# Patient Record
Sex: FEMALE | Race: BLACK OR AFRICAN AMERICAN | NOT HISPANIC OR LATINO | Employment: FULL TIME | URBAN - METROPOLITAN AREA
[De-identification: names, ages, dates, MRNs, and addresses within clinical notes are randomized per-mention and may not be internally consistent; named-entity substitution may affect disease eponyms.]

---

## 2019-09-04 ENCOUNTER — HISTORICAL (OUTPATIENT)
Dept: RADIOLOGY | Facility: HOSPITAL | Age: 34
End: 2019-09-04

## 2019-09-26 ENCOUNTER — HISTORICAL (OUTPATIENT)
Dept: ENDOSCOPY | Facility: HOSPITAL | Age: 34
End: 2019-09-26

## 2019-09-30 ENCOUNTER — HISTORICAL (OUTPATIENT)
Dept: ADMINISTRATIVE | Facility: HOSPITAL | Age: 34
End: 2019-09-30

## 2019-10-01 ENCOUNTER — HOSPITAL ENCOUNTER (OUTPATIENT)
Dept: MEDSURG UNIT | Facility: HOSPITAL | Age: 34
End: 2019-10-02
Attending: OBSTETRICS & GYNECOLOGY | Admitting: OBSTETRICS & GYNECOLOGY

## 2019-10-03 LAB
FINAL CULTURE: NO GROWTH
FINAL CULTURE: NO GROWTH

## 2019-10-09 ENCOUNTER — HOSPITAL ENCOUNTER (OUTPATIENT)
Dept: MEDSURG UNIT | Facility: HOSPITAL | Age: 34
End: 2019-10-11
Attending: OBSTETRICS & GYNECOLOGY | Admitting: OBSTETRICS & GYNECOLOGY

## 2019-10-11 LAB
GRAM STN SPEC: NORMAL
GRAM STN SPEC: NORMAL

## 2019-10-23 ENCOUNTER — HOSPITAL ENCOUNTER (OUTPATIENT)
Dept: MEDSURG UNIT | Facility: HOSPITAL | Age: 34
End: 2019-10-23
Attending: OBSTETRICS & GYNECOLOGY | Admitting: OBSTETRICS & GYNECOLOGY

## 2019-10-23 ENCOUNTER — HOSPITAL ENCOUNTER (OUTPATIENT)
Dept: MEDSURG UNIT | Facility: HOSPITAL | Age: 34
End: 2019-10-24
Attending: OBSTETRICS & GYNECOLOGY | Admitting: OBSTETRICS & GYNECOLOGY

## 2020-05-22 ENCOUNTER — HISTORICAL (OUTPATIENT)
Dept: RADIOLOGY | Facility: HOSPITAL | Age: 35
End: 2020-05-22

## 2020-09-11 ENCOUNTER — HISTORICAL (OUTPATIENT)
Dept: ADMINISTRATIVE | Facility: HOSPITAL | Age: 35
End: 2020-09-11

## 2020-09-15 ENCOUNTER — HISTORICAL (OUTPATIENT)
Dept: SURGERY | Facility: HOSPITAL | Age: 35
End: 2020-09-15

## 2021-05-06 ENCOUNTER — HISTORICAL (OUTPATIENT)
Dept: RADIOLOGY | Facility: HOSPITAL | Age: 36
End: 2021-05-06

## 2022-01-19 DIAGNOSIS — R10.2 PELVIC PAIN: Primary | ICD-10-CM

## 2022-01-19 DIAGNOSIS — N80.9 ENDOMETRIOSIS: ICD-10-CM

## 2022-02-03 ENCOUNTER — OFFICE VISIT (OUTPATIENT)
Dept: OBSTETRICS AND GYNECOLOGY | Facility: CLINIC | Age: 37
End: 2022-02-03
Payer: MEDICAID

## 2022-02-03 VITALS — DIASTOLIC BLOOD PRESSURE: 115 MMHG | SYSTOLIC BLOOD PRESSURE: 162 MMHG | WEIGHT: 190 LBS | HEART RATE: 98 BPM

## 2022-02-03 DIAGNOSIS — R10.2 PELVIC PAIN: Primary | ICD-10-CM

## 2022-02-03 PROCEDURE — 99203 PR OFFICE/OUTPT VISIT, NEW, LEVL III, 30-44 MIN: ICD-10-PCS | Mod: S$GLB,,, | Performed by: OBSTETRICS & GYNECOLOGY

## 2022-02-03 PROCEDURE — 99203 OFFICE O/P NEW LOW 30 MIN: CPT | Mod: S$GLB,,, | Performed by: OBSTETRICS & GYNECOLOGY

## 2022-02-03 PROCEDURE — 4010F ACE/ARB THERAPY RXD/TAKEN: CPT | Mod: CPTII,S$GLB,, | Performed by: OBSTETRICS & GYNECOLOGY

## 2022-02-03 PROCEDURE — 1159F MED LIST DOCD IN RCRD: CPT | Mod: CPTII,S$GLB,, | Performed by: OBSTETRICS & GYNECOLOGY

## 2022-02-03 PROCEDURE — 3077F SYST BP >= 140 MM HG: CPT | Mod: CPTII,S$GLB,, | Performed by: OBSTETRICS & GYNECOLOGY

## 2022-02-03 PROCEDURE — 3077F PR MOST RECENT SYSTOLIC BLOOD PRESSURE >= 140 MM HG: ICD-10-PCS | Mod: CPTII,S$GLB,, | Performed by: OBSTETRICS & GYNECOLOGY

## 2022-02-03 PROCEDURE — 1159F PR MEDICATION LIST DOCUMENTED IN MEDICAL RECORD: ICD-10-PCS | Mod: CPTII,S$GLB,, | Performed by: OBSTETRICS & GYNECOLOGY

## 2022-02-03 PROCEDURE — 3080F DIAST BP >= 90 MM HG: CPT | Mod: CPTII,S$GLB,, | Performed by: OBSTETRICS & GYNECOLOGY

## 2022-02-03 PROCEDURE — 3080F PR MOST RECENT DIASTOLIC BLOOD PRESSURE >= 90 MM HG: ICD-10-PCS | Mod: CPTII,S$GLB,, | Performed by: OBSTETRICS & GYNECOLOGY

## 2022-02-03 PROCEDURE — 4010F PR ACE/ARB THEARPY RXD/TAKEN: ICD-10-PCS | Mod: CPTII,S$GLB,, | Performed by: OBSTETRICS & GYNECOLOGY

## 2022-02-03 RX ORDER — LISINOPRIL 10 MG/1
TABLET ORAL
COMMUNITY
Start: 2022-01-12

## 2022-02-03 NOTE — PROGRESS NOTES
Subjective:       Patient ID: Waldo Yu is a 36 y.o. female.    Chief Complaint:  Consult      History of Present Illness  HPI  Pt here with abd and pelvic pain- has appt with gyn onc for endo.  Has had multiple surgeries.  Pt states she wants to establish care.  Pt has extensive history in  and asked me today for pain meds.      GYN & OB History  No LMP recorded. Patient has had a hysterectomy.   Date of Last Pap: No result found    OB History    Para Term  AB Living   5 3 3   1 3   SAB IAB Ectopic Multiple Live Births   1              # Outcome Date GA Lbr Bari/2nd Weight Sex Delivery Anes PTL Lv   5             4 SAB            3 Term            2 Term            1 Term                Review of Systems  Review of Systems   Constitutional: Negative for chills and fever.   Respiratory: Negative for shortness of breath.    Cardiovascular: Negative for chest pain.   Gastrointestinal: Positive for abdominal pain. Negative for blood in stool, constipation, diarrhea, nausea, vomiting and reflux.   Genitourinary: Negative for dysmenorrhea, dyspareunia, dysuria, hematuria, hot flashes, menorrhagia, menstrual problem, pelvic pain, vaginal bleeding, vaginal discharge, postcoital bleeding and vaginal dryness.   Musculoskeletal: Negative for arthralgias and joint swelling.   Integumentary:  Negative for rash and hair changes.   Psychiatric/Behavioral: Negative for depression. The patient is not nervous/anxious.            Objective:    Physical Exam:   Constitutional: She appears well-developed and well-nourished. No distress.    HENT:   Head: Normocephalic and atraumatic.    Eyes: Conjunctivae and EOM are normal.    Neck: No tracheal deviation present. No thyromegaly present.    Cardiovascular: Exam reveals no clubbing, no cyanosis and no edema.     Pulmonary/Chest: Effort normal. No respiratory distress.        Abdominal: Soft. She exhibits no distension and no mass. There is no abdominal  tenderness. There is no rebound and no guarding. No hernia.     Genitourinary:    Vagina and rectum normal.      Pelvic exam was performed with patient supine.   There is no rash, tenderness, lesion or injury on the right labia. There is no rash, tenderness, lesion or injury on the left labia. Right adnexum displays no mass, no tenderness and no fullness. Left adnexum displays no mass, no tenderness and no fullness. Vaginal cuff normal.  No  no vaginal discharge in the vagina. Cervix is absent.Uterus is absent.                Skin: Skin is warm and dry. She is not diaphoretic. No cyanosis. Nails show no clubbing.    Psychiatric: She has a normal mood and affect. Her behavior is normal. Judgment and thought content normal.          Assessment:        1. Pelvic pain                Plan:      Keep appt with gyn onc

## 2022-04-07 ENCOUNTER — HISTORICAL (OUTPATIENT)
Dept: ADMINISTRATIVE | Facility: HOSPITAL | Age: 37
End: 2022-04-07
Payer: MEDICAID

## 2022-04-24 VITALS
SYSTOLIC BLOOD PRESSURE: 139 MMHG | WEIGHT: 203.06 LBS | HEIGHT: 63 IN | BODY MASS INDEX: 35.98 KG/M2 | OXYGEN SATURATION: 100 % | DIASTOLIC BLOOD PRESSURE: 97 MMHG

## 2022-04-30 NOTE — PROGRESS NOTES
Patient:   Waldo Yu            MRN: [id number]            FIN: [id number]               Age:   33 years     Sex:  Female     :  1985   Associated Diagnoses:   None   Author:   Marlin Ross MD      Pre-Op Dx:  cuff dehiscience s/p cuff endometrioma resection        Plan: vaginal cuff revision    Reviewed resident's H&P.  Agree with plan.  Proceed with case

## 2022-04-30 NOTE — ED PROVIDER NOTES
Patient:   Waldo Yu            MRN: [id number]            FIN: [id number]               Age:   33 years     Sex:  Female     :  1985   Associated Diagnoses:   Vaginal bleeding; History of hysterectomy   Author:   Juan M Thomas MD      Basic Information   Time seen: Immediately upon arrival.   History source: Patient.   Arrival mode: Private vehicle.   History limitation: None.   Additional information: Chief Complaint from Nursing Triage Note : Chief Complaint   10/23/2019 16:31 CDT     Chief Complaint           recent vaginal cuff sx this month with packing removal on 10/20 - patient of Dr. Aleman - patient c/o increased pain post packing removal  .   Provider/Visit info:   Time Seen:  Juan M Thomas MD / 10/23/2019 17:29  .   History of Present Illness   The patient presents for recent vaginal cuff surgery, subsequent hemorrhage, seen at hospital in [facility].  2 days in hospital patient contacted her GYN Dr. Aleman who instructed her to report to the ER.  .        Review of Systems   Constitutional symptoms:  No fever, no chills, no sweats.    Skin symptoms:  No rash,    Eye symptoms:  No recent vision problems,    ENMT symptoms:  No sore throat,    Respiratory symptoms:  No shortness of breath, no cough.    Cardiovascular symptoms:  No chest pain, no tachycardia.    Gastrointestinal symptoms:  No abdominal pain, no nausea, no vomiting.    Genitourinary symptoms:  Negative except as documented in HPI.   Musculoskeletal symptoms:  No back pain, no Muscle pain.    Neurologic symptoms:  No headache, no dizziness.              Additional review of systems information: All other systems reviewed and otherwise negative.      Health Status   Allergies:    Allergic Reactions (Selected)  Severity Not Documented  NKDA - No known drug allergies- No reactions were documented.,    Allergies (1) Active Reaction  NKDA - No known drug allergies None Documented  .   Medications:  (Selected)    Inpatient Medications  Ordered  Ibuprofen 600 mg tablet: 600 mg, form: Tab, Oral, q6hr, first dose 10/23/19 17:21:00 CDT, STAT, Give for pain level 1-6, wait 30 min and give opioid if unresolved/no improvement.  MiraLax (polyethylene glycol 3350): 17 gm, form: Powder-Recon, Oral, Daily, first dose 10/23/19 17:24:00 CDT, STAT  Ondansetron INJ.  (IV Push/IM)  2 mg/mL: 4 mg, form: Injection, IV Push, q6hr PRN for nausea/vomiting, first dose 10/23/19 17:21:00 CDT, STAT, Choose first if ordered with other anitemetics or if jonathan PO intake.  Tylenol: 500 mg, form: Tab, Oral, BID PRN for fever, first dose 10/23/19 17:22:00 CDT, STAT  acetaminophen-oxycodone 325 mg-5 mg oral tablet: 1 tab(s), form: Tab, Oral, q4hr PRN for pain, first dose 10/23/19 17:21:00 CDT, Pain scale 1-5; DO NOT GIVE WITHIN 6 hrs of Ofirmev or Oxycodone  acetaminophen-oxycodone 325 mg-5 mg oral tablet: 2 tab(s), form: Tab, Oral, q4hr PRN for pain, first dose 10/23/19 17:21:00 CDT, Pain scale 6-10; DO NOT GIVE WITHIN 6 hrs of Ofirmev or Oxycodone  belladonna-opium 16.2 mg-60 mg rectal suppository: 1 supp, form: Supp, [place] (rectal), At Bedtime PRN for pain, mild, first dose 10/23/19 17:26:00 CDT  docusate sodium 100 mg oral capsule: 100 mg, form: Cap, Oral, BID, first dose 10/23/19 17:21:00 CDT, STAT  ferrous sulfate 325 mg oral tablet: 325 mg, form: Tab, Oral, Once, first dose 10/23/19 17:24:00 CDT, stop date 10/23/19 17:24:00 CDT, STAT  gabapentin 300 mg oral capsule: 300 mg, form: Cap, Oral, At Bedtime, first dose 10/23/19 17:30:00 CDT, STAT  promethazine 25 mg/mL injectable solution: 12.5 mg, form: Injection, IV Push, q6hr PRN for nausea/vomiting, first dose 10/23/19 17:21:00 CDT, STAT, may also give IM route if needed.  simethicone 80 mg Chew Tab ( Mylanta Gas ): 80 mg, form: Tab-Chew, Oral, q4hr PRN for gas, first dose 10/23/19 17:21:00 CDT, STAT  Prescriptions  Prescribed  Percocet 10/325 oral tablet: 1 tab(s), Oral, q6hr, PRN PRN for pain, # 7  tab(s), 0 Refill(s), Pharmacy: [id number]  Premarin 0.625 mg/g vaginal cream with applicator: 1 gm =, VAG, qPM, # 42.5 gm, 0 Refill(s), Pharmacy: [facility]  gabapentin 300 mg oral capsule: 300 mg = 1 cap(s), Oral, QHS Resp, # 30 cap(s), 0 Refill(s), Pharmacy: [facility]  lisinopril 10 mg oral tablet: 10 mg = 1 tab(s), Oral, Daily, # 90 tab(s), 2 Refill(s), Pharmacy: [facility] #[id number].      Past Medical/ Family/ Social History   Medical history:    Resolved  Pregnant ([id number]): Onset on 3/17/2004 at 18 years.  Resolved on 12/15/2004 at 19 years.  Pregnant ([id number]): Onset on 10/19/2000 at 14 years.  Resolved on 7/26/2001 at 15 years.  Pregnant ([id number]):  Resolved on 4/26/1996 at 10 years..   Surgical history:    Revision of Vaginal cuff (None) on 10/10/2019 at 33 Years.  Comments:  10/10/2019 8:59 Gina Parker RN  auto-populated from documented surgical case  Proctoscopy (None) on 10/1/2019 at 33 Years.  Comments:  10/1/2019 11:05 Cris Perez RN  auto-populated from documented surgical case  Cystoscopy (None) on 10/1/2019 at 33 Years.  Comments:  10/1/2019 11:05 Cris Perez RN  auto-populated from documented surgical case  Fulgaration of Endometriosis (None) on 10/1/2019 at 33 Years.  Comments:  10/1/2019 11:05 Cris Perez RN  auto-populated from documented surgical case  Revision of Vaginal cuff (None) on 10/1/2019 at 33 Years.  Comments:  10/1/2019 11:05 Cris Perez RN  auto-populated from documented surgical case  Stent Insertion/Removal (None) on 10/1/2019 at 33 Years.  Comments:  10/1/2019 11:05 Cris Perez RN  auto-populated from documented surgical case  Lysis Of Adhesions (None) on 10/1/2019 at 33 Years.  Comments:  10/1/2019 11:05 CDT - Rustam MACE, Cris WILD  auto-populated from documented surgical case  Polypectomy (None) on 9/26/2019 at 33 Years.  Comments:  9/26/2019 11:04 CDJANETH Campos  Perlita MACE  auto-populated from documented surgical case  Colonoscopy (None) on 2019 at 33 Years.  Comments:  2019 11:04 Perlita Joaquin RN  auto-populated from documented surgical case  Hysterectomy Laparoscopic Robotic Assist Si (.) on 2016 at 30 Years.  Comments:  2016 13:02 Herlinda Mock RN  auto-populated from documented surgical case  Cystectomy Laparoscopic Robotic Assist (.) on 2016 at 30 Years.  Comments:  2016 13:02 Herlinda Mock RN  auto-populated from documented surgical case   section ([id number]) on 1996 at 10 Years.  biopsy - bladder.  cholecystectomy.  hernia repair..   Family history:    Acute myocardial infarction.  Mother  Diabetes mellitus type 1.  Sister  .   Social history:    Social & Psychosocial Habits    Alcohol  2016  Use: Never    Employment/School  2018  Status: Employed    Activity level: Moderate physical work    2019  Status: Employed    Home/Environment  2019  Lives with: Children    Living situation: Home/Independent    Nutrition/Health  2019  Home Diet Regular    Appetite Good    Other    Comment: Single, Hinduism - 2018 10:17 - Nora Armendariz    Sexual  2018  Sexually active: Yes    First active at age: 14 Years    Current partners: 1    Uses condoms: No    History of sexual abuse: No    2019  Sexually active: Yes    History of STIs No    Substance Use  2016  Use: Never    Tobacco  10/23/2019  Use: Never (less than 100 in l    Patient Wants Consult For Cessation Counseling N/A    Abuse/Neglect  10/23/2019  SHX Any signs of abuse or neglect No  .   Problem list:    Active Problems (18)  Abnormal vaginal bleeding   Bladder endometriosis   Breast pain   Cigarette smoker   Deep infiltrative endometriosis   Endometriosis, pelvic peritoneum   Female pelvic pain   Gastric reflux   HTN (hypertension)   Hypertension   Myalgia   Obesity   Pelvic pain   Post-op  pain   Preop examination   S/P laparoscopic hysterectomy   Uterine endometriosis   Vaginal endometriosis   .      Physical Examination               Vital Signs      Vital Signs (last 24 hrs)_____  Last Charted___________  Temp Oral     36.8 DegC  (OCT 23 16:31)  Heart Rate Peripheral   H 104bpm  (OCT 23 16:31)  Resp Rate         18 br/min  (OCT 23 16:31)  SBP      H 159mmHg  (OCT 23 16:31)  DBP      H 114mmHg  (OCT 23 16:31)  SpO2      98 %  (OCT 23 16:31)  Weight      86 kg  (OCT 23 16:31)  Height      160 cm  (OCT 23 16:31)  BMI      33.59  (OCT 23 16:31)  .   General:  Alert, no acute distress.    Skin:  Warm, dry.    Head:  Normocephalic.   Neck:  Supple, trachea midline.    Eye:  Pupils are equal, round and reactive to light, extraocular movements are intact.    Ears, nose, mouth and throat:  Oral mucosa moist.   Cardiovascular:  Regular rate and rhythm, No murmur, Normal peripheral perfusion, No edema.    Respiratory:  Lungs are clear to auscultation, respirations are non-labored, breath sounds are equal, Symmetrical chest wall expansion.    Gastrointestinal:  Soft, Nontender, Non distended, Normal bowel sounds, No organomegaly.    Genitourinary:  deferred to GYN.   Back:  Normal range of motion.   Musculoskeletal:  No swelling, no deformity.    Neurological:  Alert and oriented to person, place, time, and situation, normal motor observed, normal speech observed.    Psychiatric:  Cooperative.      Medical Decision Making   Documents reviewed:  Emergency department nurses' notes.   Results review:  Lab results : Lab View   10/23/2019 16:49 CDT     Sodium Lvl                140 mmol/L                             Potassium Lvl             3.2 mmol/L  LOW                             Chloride                  106 mmol/L                             CO2                       30 mmol/L                             Calcium Lvl               9.6 mg/dL                             Glucose Lvl               87 mg/dL                              BUN                       6 mg/dL  LOW                             Creatinine                0.70 mg/dL                             eGFR-AA                   >105 mL/min                             eGFR-ZAK                  101 mL/min                             Bili Total                0.3 mg/dL                             Bili Direct               0.1 mg/dL                             Bili Indirect             0.2 mg/dL                             AST                       11 unit/L  LOW                             ALT                       18 unit/L                             Alk Phos                  76 unit/L                             Total Protein             7.9 gm/dL                             Albumin Lvl               3.4 gm/dL                             Globulin                  4.50 gm/mL  HI                             A/G Ratio                 0.8 ratio  LOW                             WBC                       10.0 x10(3)/mcL                             RBC                       3.71 x10(6)/mcL  LOW                             Hgb                       10.5 gm/dL  LOW                             Hct                       33.0 %  LOW                             Platelet                  272 x10(3)/mcL                             MCV                       88.9 fL                             MCH                       28.3 pg                             MCHC                      31.8 gm/dL                             RDW                       13.3 %                             MPV                       9.7 fL                             Abs Neut                  6.02 x10(3)/mcL                             Neutro Auto               60 %  NA                             Lymph Auto                24 %  NA                             Mono Auto                 7 %  NA                             Eos Auto                  8 %  NA                             Abs Eos                   0.8  x10(3)/mcL                             Basophil Auto             1 %  NA                             Abs Neutro                6.02 x10(3)/mcL                             Abs Lymph                 2.4 x10(3)/mcL                             Abs Mono                  0.7 x10(3)/mcL                             Abs Baso                  0.1 x10(3)/mcL                             IG%                       0 %  NA                             IG#                       0.0300  NA  .      Reexamination/ Reevaluation   Vital signs   results included from flowsheet : Vital Signs   10/23/2019 16:31 CDT     Temperature Oral          36.8 DegC                             Temperature Oral (calculated)             98.24 DegF                             Peripheral Pulse Rate     104 bpm  HI                             Respiratory Rate          18 br/min                             SpO2                      98 %                             Oxygen Therapy            Room air                             Systolic Blood Pressure   159 mmHg  HI                             Diastolic Blood Pressure  114 mmHg  HI        Impression and Plan   Diagnosis   Vaginal bleeding (JNL75-NH N93.9)   History of hysterectomy (HKB89-EQ Z90.710)      Calls-Consults   -  10/23/2019 17:49:00 , GYN, consult.    Plan   Condition: Guarded.    Disposition: Admit time  10/23/2019 17:49:00, Place in Observation Unit.
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
## 2022-05-02 NOTE — HISTORICAL [facility] CERNER
This is a historical note converted from [employer]. Formatting and pictures may have been removed.  Please reference [employer] for original formatting and attached multimedia. Indication for Surgery  34 yo F s/p TLH, partial bladder cystectomy for endometriosis in 2016 with recurrent symptoms of pelvic pain, MRI findings of suspected endometrioma at vaginal cuff, as well as recurrent vaginal bleeding- not controlled with Depot Lupron or Orilissa at this point.? Patient had been followed for 1 year and conservative measures done such as pelvic pT as well as medications without much relief.? She desired surgical treatment.? Risks of : bladder injury, incomplete resection of endometriosis, persistence of problem, need for colon surgery were discussed.? She had seen the general surgery team here pre-operatively in planning for this procedure and underwent colonoscopy recently that did not reveal any mucosal endometriosis.  Of note, the patient had previously been counseled on oophorectomy versus leaving ovaries and she strongly desired to leave ovaries in place.? She understood the risks of needing further surgery or persistence of endometriosis with these in place.? The plan will be to suppress medically.? Of note, her risks of earlier death from all cause mortality were reviewed with her if oophorectomy performed at this age without HRT given.  Preoperative Diagnosis  vaginal cuff endometriosis  pelvic pain  post hysterectomy vaginal bleeding  Postoperative Diagnosis  same  rectovaginal endometriosis/nodularity  Operation  Laparoscopic lysis of adhesions, excision of vaginal cuff endometrioma, vaginal cuff revision and laparoscopic closure? (see separate note by Dr. Mario/Dr. Clifford with general surgery for intraoperative consultation)  Cystoscopy with ureteral stent placement  ?   *Modifier requested as I asked Dr. Ross to assist me (Co-surgeon)?as a qualified assistant given expert level of?assistance needed?during  this difficult case.  Surgeon(s)  Dr. TRAVIS Arce  Assistant  Dr. FREDERIC Ross**  Dr. TR Murphy  Anesthesia  GeTA, 1% lidocaine plain  Estimated Blood Loss  200cc  ?  IVF:  Urine Output  150cc  Findings  vaginal:? tethering and palpable 3cm area of mass like at posterior uterine wall/upper left cuff, several punctate red areas of endometriotic vesicles.? (Vaginal bleeding?noted on exam c/w patients history-?has?been experiencing bleeding over past week).?  Cystoscopy (prior to procedure): no bladder erythema or evidence of endometriosis, areas of puckering c/w prior resection- well healed.? Bilateral ureteral orifices in normal location.? Post Op: No bladder lesions, trauma or sutures.? Jean Pierre brisk efflux of urine from each ureteral orifices.? (Stents removed at end of case- blood tinged urine noted).  Laparoscopy: Liver, upper abdomen normal in appearance, no endometriosis.? Appendix normal, without endometriosis or adhesions.  Pelvis: small filmy omental adhesions to anterior abdominal wall, sigmoid mesentery adherent to left ovary and left pelvic sidewall, right epiploicae adhesions to right ovarian fossa. Rectosigmoid adherent to vaginal cuff- released.? Areas of bladder peritoneal scar noted, well healed.  Following colpotomy for mass, rectosigmoid area was noted to be densely adherent even further down from original cuff apex.? This was sharply taken down to allow for cuff closure (and ~3cm of cuff removed to proximal vagina).? Mass mostly located in left cuff apex/retroperitoneal area.? No visible borders seen, no endometriosis was visible in vaginal epithelium at end of case.?  ?  Following this, Dr. Mario performed anoscopy (see note for details).  Specimen(s)  vaginal cuff mass (suspected endometrioma, at least 3cm of tissue sent)  Complications  none  Technique  ??The patient was taken to the operating suite with IV fluids running and placed in supine position. ?SCDs were placed and  turned on for DVT prophylaxis.? Antibiotics were given.?The patients arms were tucked at her side with padding. ?General anesthesia was then administered. ?The patient was then placed in lithotomy position with care taken to avoid pressure on vulnerable pressure points. A timeout procedure was performed per protocol. ?Examination was performed and vaginal findings noted.  ??She was then prepped and draped in the usual sterile fashion.??Cystoscopy was performed, findings noted above.? Bilateral open ended ureteral stents were then placed without difficulty (Dr. Finley was present with me during this portion).? A lizama catheter was placed and all other instruments removed. A vaginal spongestick was placed.  ??  ??Attention was then turned to the laparoscopic portion. ?1% lidocaine was injected into?Palmers point in the left upper abdomen.??The 5mm visiport trocar was placed under direct visualization while the abdominal wall was tented upward. ?Once intraperitoneal placement was confirmed, the abdomen was insufflated with CO2 gas to create a pneumoperitoneum. ?A complete survey was taken of the upper abdomen and findings above.??  ?She was then placed in Trendelenburg position and two additional 5mm trocars were placed in the left and right lower quadrants under direct visualization in the same fashion.? A midline 12mm trocar placed? above the umbilicus and one in the?right mid abdomen and a 30 degree 10mm?laparoscope used for the majority of the case from here.??The midline omental adhesions were lysed using the Harmonic device.??Once the?pelvis was exposed filmy and dense adhesions of bilateral pelvic sidewalls (sigmoid and epiploica on both sides) were lysed sharply with scissors.? Additional blunt and sharp dissection using laparoscopic scissors was performed along the avascular planes between the vaginal cuff and the remaining bowel adhesions to this.??This required approximately 30 minutes of?dissection to see  the entire vaginal cuff.?At this point, vaginal mass was palpated on the left vaginal cuff area.? A horizontal incision was made?in the area of vesicouterine reflection using the scissors and care taken to remain posterior and inferior to any vesicouterine adipose tissue. This was carried sharply using the Harmonic device across and to the left toward the cardinal ligament remnant.? The left ureter was palpated with the stent in place and lateral to this dissection.? The dissection was carried?through the vaginal wall for a colpotomy and the intravaginal vesicles seen.? This was grasped with a tenaculum laparoscopically and completely resected based on visualization and palpation.? The dissection was carried laterally and?approximately 3cm of the vaginal epithelium was removed in this area.? The retroperitoneum on this side was also?opened and the hardened area removed retroperitoneally.??During this time, and EEA sizer was placed rectally for mobilization and?delineation.? A vaginal?Allis clamp was placed on the mass and vaginal manipulation employed for delineation as well.  ?   ? Attention was then turned vaginally for complete excision sharply using long Hernandez scissors.?? This was then sent for permanent pathology.  ??Bleeding was?encountered during this time and a figure of eight suture was placed left laterally in the vaginal apex retroperitoneum (posterior to the cardinal ligaments/below the ureter).? Once the specimen was removed,?gloves were exchanged and attention?turned?back to laparoscopy.? The area was reinspected and the rectovaginal area still noted to be tethered to the rectum?approximately 1cm?distal to the existing colpotomy.? Sharp and blunt dissection mobilized this away on bilateral sides of the rectum and midline, just enough?for sufficient cuff closure.? There was no palpable vaginal mass or hardness at this point.?  ?Dr. Clifford was called in to evaluate and it was determined that no further  resection was needed (all vaginal areas removed and colonoscopy did not show mucosal endometriosis).? The left space was coagulated with Walker for hemostasis with care taken to remain well away from ureter.  ? The vaginal cuff was then closed with a running 0-Stratifix and closed in two layers.? Excellent?approximation noted, and no further bleeding noted.?The cuff was copiously irrigated and the irrigant removed.  Floseal was then placed in the left vaginal cuff angle/retroperitoneal space, as well as across the entire cuff.  ?   Under direct laparoscopic visualization, using the Berci needle, the supraumbilical trocar fascia and peritoneum were closed using a single 0-vicryl suture and the trocar was removed. ?Good closure was noted.??  ?  ??The instruments and trocars were removed. ?Five manual breaths were given for evacuation of pneumoperitoneum through the final trocar before it was removed. ?The incisions were closed with a 4-0 Vicryl and skin closed with dermabond skin adhesive. ?Repeat injection of lidocaine performed.? repeat cystoscopy performed, no trauma noted- stents and lizama were removed.? I then performed vaginal inspection and there were no further endometriotic lesions, good mobility of vagina, and?good cuff closure.? I injected?1% lidocaine plain into the vaginal cuff?for periop pain control.? The vagina was then irrigated and EEA sizer had been?removed.??  ?Anoscopy was then performed by Dr. Mairo.  ?  ?? The patient was awakened and taken to recovery in stable condition. ?

## 2022-05-02 NOTE — HISTORICAL [facility] CERNER
This is a historical note converted from [employer]. Formatting and pictures may have been removed.  Please reference [employer] for original formatting and attached multimedia. Chief Complaint  S/P RAMESH, Excision of endometrioma 10/1/19-complants of bleeding postoperative since 10/5/19.  History of Present Illness  34 yo s/p laparoscopic lysis of adhesions, excision of?vaginal cuff endometrioma,?vaginal cuff revision, laparoscopic closure, cystoscopy with?ureteral stent placement and removal, anoscopy at completion of case by General Surgery for deep, infiltrating endometriosis on 10/1/2019.  ?   Patient states she was doing well until she began to experience vaginal bleeding?on October 5th, saturating 12 pads a day. Patient states quality of bleeding changes, sometimes appears watery, at other times appears bright red blood. She also reports concurrent development of intense suprapubic pain stabbing in natures that occasionally radiates to the vagina. Reports occasional lightheadedness and dizziness while ambulating.  Tolerating a regular diet. Reports BM every other day, soft, no straining, no associated pain.  For pain, has been taking ibuprofen TID, gabapentin BID, occasionally lidocaine patches which do help. Ran out of percocet which was helping her while she was taking it. B&O suppositories were not covered by her insurance.  ?  Review of Systems  *Positive ROS are in bold, otherwise negative  ?   General: fevers/chills, fatigue  GI: abdominal pain, diarrhea, constipation  CV: chest pain, palpitations  Resp: SOB, cough  Gyn: bleeding,?abnormal discharge  : hematuria, dysuria, pain with bladder fullness  Neuro: headaches, weakness  MSK: joint?pain, back pain  Physical Exam  ???Vitals & Measurements  ??T:?36.8? ?C (Oral)? HR:?86(Peripheral)? RR:?20? BP:?148/95?  ??HT:?160?cm? WT:?87.6?kg? BMI:?34.22?  General Appearance: Alert, cooperative, no distress  Lungs: Respirations unlabored  Heart: Regular rate  Abdomen:  Soft, obese, suprapubic tenderness  Incisions:  Extremities: Extremities normal, atraumatic, no cyanosis or edema  SSE: normal external female genitalia, vaginal mucosa pink, moist without lesion, cuff examined, watery, serosanguineous discharge noted, cuff probed with q-tip and defect noted at left side of cuff  ?  Assessment/Plan  ?   34 yo s/p laparoscopic lysis of adhesions, excision of?vaginal cuff endometrioma,?vaginal cuff revision, laparoscopic closure, cystoscopy with?ureteral stent placement and removal, anoscopy at completion of case by General Surgery for deep, infiltrating endometriosis on 10/1/2019. Now with vaginal bleeding, cuff defect noted on exam.  ?   Admit for vaginal cuff repair with general anesthesia in OR.  CBC to be collected now.  Patient last ate at 8:30 AM, enchiladas with beans and rice.  Plan to keep patient overnight for observation.  ?  Patient seen and examined with Dr. Aleman. OB History  Pregnancy History???(3,0,0,3)?? ??  Pregnancy # 1?? ? ?**Marked as Sensitive**  Baby 1?????????????Outcome Date:?1996????? Outcome:?Live Birth  ???Outcome or Result:?  ???Gender:?Male????????Gest Age:?Fullterm ??????Wt:??3260 g  ???Hospital:?--????????[facility] Labor:?--  ???Renny Name:?--?????Babys Father:?--  ?  Pregnancy # 2  Baby 1?????????????Outcome Date:?2001????? Outcome:?Live Birth  ???Outcome or Result:?Vaginal  ???Gender:?Female????????Gest Age:?40 weeks ??????Wt:??3856 g  ???Hospital:?--????????[facility] Labor:?--  ???Renny Name:?--?????Babys Father:?--  ?  Pregnancy # 3?? ? ?**Marked as Sensitive**  Baby 1?????????????Outcome Date:?12/15/2004????? Outcome:?Live Birth  ???Outcome or Result:?Vaginal  ???Gender:?Female????????Gest Age:?39 weeks ??????Wt:??4337 g  ???Hospital:?--????????[facility] Labor:?--  ???Renny Name:?--?????Babys Father:?--  Problem List/Past Medical History  Ongoing  ??Abnormal vaginal bleeding  ?Bladder endometriosis  ?Breast  pain  ?Cigarette smoker  ?Deep infiltrative endometriosis  ?Endometriosis, pelvic peritoneum  ?Female pelvic pain  ?Gastric reflux  ?HTN (hypertension)  ?Hypertension  ?Myalgia  ?Obesity  ?Pelvic pain  ?Post-op pain  ?Preop examination  ?S/P laparoscopic hysterectomy  ?Uterine endometriosis  ?Vaginal endometriosis  Historical  ??Pregnant  ??Pregnant  ??Pregnant  Procedure/Surgical History  ?Anoscopy; diagnostic, including collection of specimen(s) by brushing or washing, when performed (separate procedure) (10/01/2019)  Cystoscopy (None) (10/01/2019)  Excision of Vagina, Via Natural or Artificial Opening (10/01/2019)  Excision of vaginal cyst or tumor (10/01/2019)  Fulgaration of Endometriosis (None) (10/01/2019)  Inspection of Lower Intestinal Tract, Via Natural or Artificial Opening Endoscopic (10/01/2019)  Lysis Of Adhesions (None) (10/01/2019)  Proctoscopy (None) (10/01/2019)  Release Sigmoid Colon, Percutaneous Endoscopic Approach (10/01/2019)  Revision of Vaginal cuff (None) (10/01/2019)  Stent Insertion/Removal (None) (10/01/2019)  Colonoscopy (None) (09/26/2019)  Colonoscopy, flexible; with biopsy, single or multiple (09/26/2019)  Colonoscopy, flexible; with removal of tumor(s), polyp(s), or other lesion(s) by snare technique (09/26/2019)  Excision of Sigmoid Colon, Via Natural or Artificial Opening Endoscopic, Diagnostic (09/26/2019)  Excision of Sigmoid Colon, Via Natural or Artificial Opening Endoscopic, Diagnostic (09/26/2019)  Polypectomy (None) (09/26/2019)  Cystectomy Laparoscopic Robotic Assist (.) (04/27/2016)  Dilation of Bilateral Ureters with Intraluminal Device, Via Natural or Artificial Opening Endoscopic (04/27/2016)  Excision of Bladder, Percutaneous Endoscopic Approach (04/27/2016)  Hysterectomy Laparoscopic Robotic Assist Si (.) (04/27/2016)  Release Uterus, Percutaneous Endoscopic Approach (04/27/2016)  Resection of Bilateral Fallopian Tubes, Via Natural or Artificial Opening With  Percutaneous Endoscopic Assistance (2016)  Resection of Uterus, Via Natural or Artificial Opening With Percutaneous Endoscopic Assistance (2016)  Robotic Assisted Procedure of Trunk Region, Percutaneous Endoscopic Approach (2016)   section (1996)  biopsy - bladder  cholecystectomy  hernia repair   ?  Medications  ?gabapentin 300 mg oral capsule, 300 mg= 1 cap(s), Oral, QHS Resp  ?lidocaine 5% topical film, 1 patch(es), TOP, Daily  ?lisinopril 10 mg oral tablet, 10 mg= 1 tab(s), Oral, Daily, 2 refills  ?Orilissa 150 mg oral tablet, 150 mg= 1 tab(s), Oral, Daily, 11 refills  Allergies  NKDA - No known drug allergies   [1]  [1]?[facility] Note; Elly Murphy 10/09/2019 11:49 CDT   I have personally seen and examined this patient in office.? She was healing well, then changed acutely over the weekend when she had pain and felt like?a stitch popped.? She has experienced light bleeding since, and pain that is suprapubic.  Exam: abd soft, NT/ND;? vagina: moist pink vaginal epithelium, cuff appears to have epithelial separation of 1.5cm at the left cuff edge.? Qtip can pass through and gush of serous fluid noted with sanguinous tinge.? No active bleeding, no other contents noted.? Right edge to midline intact.  Appr ttp.  ?  A: Vaginal cuff dehiscence post L/S partial vaginectomy and endometrioma excision laparoscopically; remains HDS.? This is the area where endometrioma was and I suspect the tension of the area versus healing/blood at area has caused this to breakdown.  P: Admit for vaginal cuff closure/placement of suture in left cuff angle.? Plan vaginally.  of note, this is not emergent given stability, CBC is normal, and patient remains HDS.? Since she has eaten a full meal recently 8:30 and again at 11:15am we will admit her for observation and plan this in the AM.? All teams aware.  Pain meds, regular diet given (pt hungry at time of visit, no secondary s/sx of bowel  issues).  ?

## 2022-05-02 NOTE — HISTORICAL [facility] CERNER
This is a historical note converted from [employer]. Formatting and pictures may have been removed.  Please reference [employer] for original formatting and attached multimedia. Indication for Surgery  31-year-old female with endometriosis, rectal bleeding  ?   Preoperative Diagnosis  Endometriosis  Rectal bleeding  ?   Postoperative Diagnosis  Sigmoid?polyp  ?   Operation  Colonoscopy  ?  Surgeon(s)  Miki Kiran MD, PGY 2  ?  Anesthesia  MAC  ?  Estimated Blood Loss  2 cc  ?  Findings  Normal-appearing colonic mucosa  3 mm flat?polyp?in the sigmoid colon at 40 cm  7 mm flat?polyp at 25 cm?sigmoid colon  ?  Specimen(s)  Sigmoid polyp x1  ?  Technique  The risks and benefits of the?procedure were discussed and proper consents obtained. ?Patient was brought to the GI suite?placed in left lateral decubitus position and anesthesia was induced. ?Digital rectal exam was performed that showed good sphincter tone, no palpable masses,?no gross blood. ?The endoscope was then inserted and advanced to the level of the cecum. ?The appendiceal orifice and ileocecal valve were identified. ?The scope was then withdrawn and the walls of the ascending,?transverse, descending, sigmoid colon and rectum were visualized. ?2 flat sessile?polyps were identified?in the sigmoid colon at 40 and?25 cm. ?These were removed with?biopsy forceps and cold snare respectively.? The?mucosa of the colon appeared normal. ?There was some pelvic scarring and?tortuosity to the sigmoid colon colon which failed to distend.? Rectal flexion was performed showed?mild hemorrhoidal congestion. ?The endoscope was then withdrawn and patient awakened from anesthesia.  ?  Recommendations:  Follow-up pathology  Repeat colonoscopy in 5 years

## 2022-05-02 NOTE — HISTORICAL [facility] CERNER
HCG Quant ordered STAT for patient to repeat Sunday    RN phoned patient with recommendations  Per patient, she is aware to get HCG done on Sunday and call the nurse triage line for results  Is aware that Dr. Rueda is on call  Will also get UA done at that time.    RN went in depth on what signs or symptoms to watch out for and when to head to ED  Patient will go to the ED with any severe abdominal pain, heavy bleeding, vomiting, nausea, fever or chills.    Patient is scheduled with Dr. Behrens on Monday at 3 pm  Patient works from 9-1    No further questions or concerns    This TE routed to Dr. Rueda as an FYI per Dr. Behrens request    No further questions or concerns    Encounter open for follow up     This is a historical note converted from [employer]. Formatting and pictures may have been removed.  Please reference [employer] for original formatting and attached multimedia. Admit and Discharge Dates  Admit Date: 10/23/2019  Discharge Date: 10/24/2019  Physicians  Attending Physician - Love BREEN, May S  Admitting Physician - Love BREEN, May S  Primary Care Physician - Physician MD, Non Staff  Discharge Diagnosis  Constipation?K59.00  History of hysterectomy?Z90.710  Lower extremity pain?M79.606  Pain?R52  Post surgical problem?[id number]  S/P laparoscopy?Z98.890  Vaginal bleeding?N93.9  Surgical Procedures  None  Admission Information  Patient admitted with?abdominal pain, LE pain,?vaginal spotting for observation  Hospital Course  32yo??s/p Laparoscopic lysis of adhesions, excision of vaginal cuff endometrioma, vaginal cuff revision and laparoscopic closure with Cystoscopy with ureteral stent placement (and removal) on 10/1/2019 who then presented to GYN clinic with left vaginal cuff dehiscence, POD#8 s/p vaginal cuff revision. Admitted for observation following admission at outside hospital for acute vaginal bleeding s/p revision of vaginal cuff on , 10/20/2019. Patient presented here reporting 9/10?abdominal pain,?bilateral LE pain?and some vaginal spotting. Overnight, patients pain controlled with toradol, gabapentin, and percocet. Patient had minimal vaginal spotting. Evaluated for DVT with LE doppler with negative results. Vitals wnl and H/H stable overnight with no evidence of any additional acute vaginal bleeding. Meeting all criteria and discharged on HD#2.  Significant Findings  Bilateral lower extremity doppler ultrasound: No evidence of VTE. Poor visualization in calfs.  Lab Results  Test Name Test Result Date/Time   WBC 7.2 x10(3)/mcL 10/24/2019 06:15 CDT   WBC 10.0 x10(3)/mcL 10/23/2019 16:49 CDT   Hgb 9.8 gm/dL (Low) 10/24/2019 06:15 CDT   Hgb 10.5 gm/dL (Low)  10/23/2019 16:49 CDT   Hct 30.7 % (Low) 10/24/2019 06:15 CDT   Hct 33.0 % (Low) 10/23/2019 16:49 CDT   Platelet 250 x10(3)/mcL 10/24/2019 06:15 CDT   Platelet 272 x10(3)/mcL 10/23/2019 16:49 CDT   Objective  Vitals & Measurements  T:?36.8? ?C (Oral)? TMIN:?36.8? ?C (Oral)? TMAX:?37.0? ?C (Oral)? HR:?73(Peripheral)? RR:?18? BP:?137/95? SpO2:?95%? WT:?86?kg? BMI:?33.59?  Event Name? Event Result? Date/Time?   Temperature Oral 36.8 DegC 10/24/19 07:00:00   Temperature Oral 36.9 DegC 10/24/19 04:00:00   Temperature Oral 37 DegC 10/24/19 00:00:00   Peripheral Pulse Rate 73 bpm 10/24/19 07:00:00   Peripheral Pulse Rate 78 bpm 10/24/19 04:00:00   Peripheral Pulse Rate 98 bpm 10/24/19 00:00:00   Respiratory Rate 18 br/min 10/24/19 07:00:00   Respiratory Rate 16 br/min 10/24/19 04:00:00   Respiratory Rate 16 br/min 10/24/19 00:00:00   SpO2 95 % 10/24/19 07:00:00   SpO2 94 % 10/24/19 04:00:00   SpO2 97 % 10/24/19 00:00:00   Systolic Blood Pressure 137 mmHg 10/24/19 07:00:00   Systolic Blood Pressure 111 mmHg 10/24/19 04:00:00   Systolic Blood Pressure 129 mmHg 10/24/19 00:00:00   Diastolic Blood Pressure 95 mmHg?High 10/24/19 07:00:00   Diastolic Blood Pressure 76 mmHg 10/24/19 04:00:00   Diastolic Blood Pressure 86 mmHg 10/24/19 00:00:00   Mean Arterial Pressure, Cuff 88 mmHg 10/24/19 04:00:00   Mean Arterial Pressure, Cuff 100 mmHg 10/24/19 00:00:00   ?  ?  Physical Exam  General Appearance: Alert, cooperative, no distress  Lungs: Respirations unlabored, CTAB  Heart: Regular rate  Abdomen: Soft, non-distended, non-tender, trocar sites x5 noted to be healing well  Extremities: Extremities?without edema or erythema, no calf tenderness, SCDs in place and working  Skin: Skin turgor normal, no rashes or lesions.  : small amount of vaginal spotting  Patient Discharge Condition  Good. Meeting all criteria for discharge.  Discharge Disposition  Home.   Discharge Medication Reconciliation  Prescribed  acetaminophen-oxyCODONE  (acetaminophen-oxycodone 325 mg-5 mg oral tablet)?1 tab(s), Oral, q4hr, PRN pain  belladonna-opium (belladonna-opium 16.2 mg-60 mg rectal suppository)?1 supp, ND (rectal), At Bedtime, PRN pain, mild  docusate (docusate sodium 100 mg oral capsule)?100 mg, Oral, BID  lisinopril (lisinopril 10 mg oral tablet)?10 mg, Oral, Daily  Continue  gabapentin (gabapentin 300 mg oral capsule)?300 mg, Oral, QHS Resp  lisinopril (lisinopril 10 mg oral tablet)?10 mg, Oral, Daily  Discontinue  acetaminophen-oxyCODONE (Percocet 10/325 oral tablet)?1 tab(s), Oral, q6hr, PRN for pain  conjugated estrogens topical (Premarin 0.625 mg/g vaginal cream with applicator)?1 gm, VAG, qPM  Education and Orders Provided  Abdominal Hysterectomy, Care After, Easy-to-Read  Discharge - 10/24/19 8:51:00 CDT, Home?  Follow up  Follow up in 1 week in GYN clinic.      I personally saw her on Thurs 10/24 AM and examined her.? Gen: well appearing, NAD, abd: soft/NT/ND.? Incisions healing well.?calves 36cm symmetric, no edema, no tenderness.  Final read on Doppler LE: neg for DVT, posterior knee cyst (?Bakers cyst).  Pt to be discharged to home with strict pelvic rest, f/u next week.  ?  Dx: mild asypmtomatic anemia, no further vaginal cuff bleeding s/p partial vaginectomy for endometrioma in rectovaginal/left paravaginal space.

## 2022-05-02 NOTE — HISTORICAL [facility]
This is a historical note converted from [employer]. Formatting and pictures may have been removed.  Please reference [employer] for original formatting and attached multimedia. Admit and Discharge Dates  Admit Date: 10/09/2019  Discharge Date: 10/11/2019  Physicians  Attending Physician - Love BREEN, May S  Admitting Physician - Love BEREN, May S  Primary Care Physician - Physician MD, Non Staff  Discharge Diagnosis  Endometriosis?N80  Hypertension?I10  Pelvic pain?R10.2  Vaginal cuff dehiscence?T81.31XA  Surgical Procedures  10/10/2019 - [id number] - Revision of Vaginal cuff  Admission Information  Admitted following vaginal cuff dehiscence noted at clinic appointment for repair of vaginal cuff  Hospital Course  32 yo who presented to clinic on POD #8 s/p laparoscopic excision of vaginal cuff endometrioma. Patient reported serosanguineous vaginal discharge with some vaginal spotting, as well. She had been tolerating a regular diet, voiding spontaneously, and having bowel movements. Upon examination in clinic, found to have defect in vaginal cuff at left angle along with leakage of serosanguineous fluid. Patient admitted overnight and underwent revision of vaginal cuff on 10/10/2019. Operative course uncomplicated. Patient recovered appropriately following procedure. Voiding spontaneously, tolerating a regular diet. Pain moderately well controlled. Deemed to be meeting all criteria for discharge.  Significant Findings  Lab Results  Test Name Test Result Date/Time   WBC 10.3 x10(3)/mcL 10/09/2019 11:35 CDT   Hgb 12.1 gm/dL 10/09/2019 11:35 CDT   Hct 38.3 % 10/09/2019 11:35 CDT   Platelet 254 x10(3)/mcL 10/09/2019 11:35 CDT   Objective  Vitals & Measurements  T:?37.1? ?C (Oral)? TMIN:?36.7? ?C (Oral)? TMAX:?37.2? ?C (Oral)? HR:?85(Peripheral)? RR:?16? BP:?141/85? SpO2:?97%?  Event Name? Event Result? Date/Time?   Temperature Oral 37.1 DegC 10/11/19 04:00:00   Temperature Oral 36.7 DegC 10/11/19 00:00:00   Temperature  Oral 37.2 DegC 10/10/19 19:30:00   Peripheral Pulse Rate 85 bpm 10/11/19 04:00:00   Peripheral Pulse Rate 122 bpm?High 10/11/19 00:00:00   Heart Rate Monitored 119 bpm?High 10/10/19 19:30:00   Respiratory Rate 16 br/min 10/11/19 04:00:00   Respiratory Rate 16 br/min 10/11/19 00:00:00   Respiratory Rate 18 br/min 10/10/19 19:30:00   SpO2 97 % 10/11/19 04:00:00   SpO2 97 % 10/11/19 00:00:00   SpO2 95 % 10/10/19 19:30:00   Systolic Blood Pressure 141 mmHg?High 10/11/19 04:00:00   Systolic Blood Pressure 151 mmHg?High 10/11/19 00:00:00   Systolic Blood Pressure 145 mmHg?High 10/10/19 19:30:00   Diastolic Blood Pressure 85 mmHg 10/11/19 04:00:00   Diastolic Blood Pressure 98 mmHg?High 10/11/19 00:00:00   Diastolic Blood Pressure 89 mmHg 10/10/19 19:30:00   ?  ?UOP: Spontaneous voids x6, unmeasured  Physical Exam  General Appearance: Alert, cooperative, no distress  Lungs: Respirations unlabored  Heart: Regular rate  Abdomen: Soft, non-distended, ttp in LLQ  Extremities: Extremities normal, atraumatic, no cyanosis or edema  Skin: Skin turgor normal, no rashes or lesions.  : minimal serosanguineous fluid on pad  Patient Discharge Condition  Good.  Discharge Disposition  Home.   Discharge Medication Reconciliation  Prescribed  acetaminophen (acetaminophen 500 mg oral tablet)?1,000 mg, Oral, BID  acetaminophen-oxyCODONE (Percocet 5/325 oral tablet)?1 tab(s), Oral, q4hr, PRN for pain  belladonna-opium (belladonna-opium 16.2 mg-60 mg rectal suppository)?1 supp, LA (rectal), Once a day (at bedtime)  doxycycline (doxycycline hyclate 100 mg oral tablet)?100 mg, Oral, BID  hyoscyamine (Levsin 0.125 mg oral tablet)?0.125 mg, Oral, q8hr, PRN pain  metroNIDAZOLE (Flagyl 500 mg oral tablet)?500 mg, Oral, BID  Continue  gabapentin (gabapentin 300 mg oral capsule)?300 mg, Oral, QHS Resp  gabapentin (gabapentin 300 mg oral capsule)?300 mg, Oral, QHS Resp  lidocaine topical (lidocaine 5% topical film)?1 patch(es), TOP, Daily  lidocaine  topical (lidocaine 5% topical film)?1 patch(es), TOP, Daily  lisinopril (lisinopril 10 mg oral tablet)?10 mg, Oral, Daily  Discontinue  elagolix (Orilissa 150 mg oral tablet)?150 mg, Oral, Daily  Education and Orders Provided  Discharge - 10/11/19 13:00:00 CDT, Home?  Follow up  Post-op appointment in 2 weeks to be scheduled.

## 2022-05-02 NOTE — HISTORICAL [facility]
This is a historical note converted from [employer]. Formatting and pictures may have been removed.  Please reference [employer] for original formatting and attached multimedia. Admit and Discharge Dates  Admit Date: 10/01/2019  Discharge Date: 10/02/2019  Physicians  Attending Physician - Love BREEN, May S  Admitting Physician - Love BREEN, May S  Discharge Diagnosis  Endometriosis?N80.9  ?  Status post laparoscopic surgery?Z98.890  ?  Surgical Procedures  10/01/2019 - [id number] - Proctoscopy  10/01/2019 - [id number] - Cystoscopy  10/01/2019 - [id number] - Lysis Of Adhesions  10/01/2019 - [id number] - Revision of Vaginal cuff  10/01/2019 - [id number] - Stent Insertion/Removal  10/01/2019 - [id number] - Fulgaration of Endometriosis  Admission Information  Admitted for scheduled surgery as below.  Hospital Course  32yo  admitted for planned elective surgery for deep infiltrating endometriosis. Intraoperatively she remained stable. Full list of procedures as follows: laparoscopic lysis of adhesions, excision of?vaginal cuff endometrioma,?vaginal cuff revision, laparoscopic closure, cystoscopy with?ureteral stent placement and removal, anoscopy at completion of case by General Surgery. General surgery consulted intraoperatively as well and it was determined that no further resection was needed (all vaginal areas removed and colonoscopy did not show mucosal endometriosis).?Anoscopy at completion of GYH portion of procedure was without evidence of endometriosis within rectal vault up to 13cm- but rectum noted to be tethered beyond 15cm.??  ?   Postoperatively, pt met all postop goals: by POD#1, she was ambulating independently, tolerating regular diet, voiding spontaneously, passing flatus, without evidence of infection at trocar incision sites. Denying fevers, chills, weakness, dizziness, chest pain, SOB. Pain wa moderately controlled and pt was deemed stable?for discharge POD#1.  Significant  Findings  Preop CBC --> postop CBC  6>13.9/43.2<222 --> 11.4>11.1/34.6<182  Objective  Vitals & Measurements  T:?37.2? ?C (Oral)? TMIN:?36.3? ?C (Temporal Artery)? TMAX:?37.4? ?C (Oral)? HR:?98(Peripheral)? RR:?18? BP:?143/93? SpO2:?97%?  Physical Exam  General:?Mildly distressed on exam 2/2 pain,?A/Ox3.  Head: Atraumatic, normocephalic  Eyes: EOMI, No scleral icterus, normal conjunctivae  Respiratory: CTAB  Cardiovascular: RRR  Abdomen: soft, tender to palpation in all 4 quadrants without rebound or guarding, hypoactive bowel sounds  Incisions: 5 trocar incision sites with skin glue c/d/i, well approximated, without evidence of drainage or dehiscence  : no vaginal bleeding  Skin: No rashes, warm and?dry  Extremities: no edema, no calf tenderness. SCDs in place and working bilaterally  Patient Discharge Condition  stable  Discharge Disposition  home   Discharge Medication Reconciliation  Prescribed  acetaminophen-oxyCODONE (Percocet 10/325 oral tablet)?1 tab(s), Oral, q4hr, PRN for pain  belladonna-opium (belladonna-opium 16.2 mg-60 mg rectal suppository)?1 supp, [place] (rectal), q12hr, PRN as needed for spasm  gabapentin (gabapentin 300 mg oral capsule)?300 mg, Oral, QHS Resp  ketorolac (Toradol 10 mg oral tablet)?10 mg, Oral, q8hr, PRN for pain  Continue  elagolix (Orilissa 150 mg oral tablet)?150 mg, Oral, Daily  ibuprofen (ibuprofen 800 mg oral tablet)?800 mg, Oral, TID  ibuprofen (ibuprofen 800 mg oral tablet)?800 mg, Oral, TID  lisinopril (lisinopril 10 mg oral tablet)?10 mg, Oral, Daily  Discontinue  clindamycin topical (clindamycin 2% vaginal cream), VAG, Once a day (at bedtime)  cyclobenzaprine (cyclobenzaprine 5 mg oral tablet)?5 mg, Oral, TID  traMADol (traMADol 50 mg oral tablet)?50 mg, Oral, q4hr  Education and Orders Provided  Outpatient Surgery, Adult ([facility])  Diagnostic Laparoscopy  Discharge - 10/02/19 8:01:00 CDT, Home, Give all scheduled vaccinations prior to discharge.?  Discharge Activity  - No Heavy Lifting, No heavy lifting >10 lbs, nothing inside vagina x6 weeks, no driving while on narcotic pain meds, no baths x4 weeks?  Discharge Diet - Regular?  Follow up  Follow up with OB/GYN, on 10/18/2019      I personally saw her on this morning.? Appeared well, reported pain.? I personally spent > 10 minutes at bedside explaining surgical findings, pain management expectations and plan (NSAIDs, opiates with goal to wean, gabapentin, B&O supp, lidocaine patches, abd binder), and other expectations.? We will see her in office next week.

## 2022-05-02 NOTE — HISTORICAL [facility]
This is a historical note converted from [employer]. Formatting and pictures may have been removed.  Please reference [employer] for original formatting and attached multimedia. Patient has been examined this morning and there are no updates to her H&P. She states that she is feeling well at this time, was able to get some sleep overnight. She denies any heavy vagnial bleeding, endorses some mild spotting overnight. Has not had PO since 2230 yesterday. Was ambulating within her room, tolerating regular diet prior to NPO status, voiding spontaneously, passing flatus. Denying any n/v, weakness/dizziness, but endorsing some sharp pain that is intermittent at site of vaginal cuff dehiscence and some intermittent sharp left sided abdominal pain that is not currently present.  She is feeling well this morning without any complaints. Can describe scheduled procedure in her own words.  Presents today with her cousin Jyoti #[phone].  ?   PE:  Event Name? Event Result? Date/Time?   Temperature Oral 36.8 DegC 10/10/19 03:00:00   Temperature Oral 36.8 DegC 10/10/19 00:00:00   Temperature Oral 36.2 DegC 10/09/19 20:00:00   Peripheral Pulse Rate 85 bpm 10/10/19 06:00:00   Peripheral Pulse Rate 82 bpm 10/10/19 03:00:00   Peripheral Pulse Rate 82 bpm 10/10/19 00:00:00   Peripheral Pulse Rate 95 bpm 10/09/19 20:00:00   Respiratory Rate 20 br/min 10/10/19 06:07:00   Respiratory Rate 16 br/min 10/10/19 03:00:00   Respiratory Rate 16 br/min 10/10/19 00:00:00   Respiratory Rate 16 br/min 10/09/19 20:00:00   SpO2 100 % 10/10/19 06:00:00   SpO2 96 % 10/10/19 03:00:00   SpO2 100 % 10/10/19 00:00:00   SpO2 98 % 10/09/19 20:00:00   Systolic Blood Pressure 177 mmHg?High 10/10/19 06:00:00   Systolic Blood Pressure 147 mmHg?High 10/10/19 03:00:00   Systolic Blood Pressure 164 mmHg?High 10/10/19 00:00:00   Systolic Blood Pressure 166 mmHg?High 10/09/19 20:00:00   Diastolic Blood Pressure 93 mmHg?High 10/10/19 06:00:00   Diastolic Blood Pressure 90  mmHg 10/10/19 03:00:00   Diastolic Blood Pressure 91 mmHg?High 10/10/19 00:00:00   Diastolic Blood Pressure 97 mmHg?High 10/09/19 20:00:00   (Pt has not taken her BP meds)  ?  General: NAD, A/Ox3. Well appearing.  Respiratory: normal work of breathing, CTAB  Cardiovascular: RRR  Abdomen: soft, nondistended, nontender to palpation. 5 trocar incision site well approximated, c/d/i  Extremities: no edema, no calf tenderness  ?   Plan: To OR for vaginal cuff closure.  ?   Haritha Arce MD  [facility], PGY-3   Reviewed the patients medical history, residents findings on physical exam, and the diagnosis with treatment plan. Care provided was reasonable and necessary.   I have seen the patient pre-operatively on admission.? Dr. Ross attending for the surgery (See above)

## 2022-05-02 NOTE — HISTORICAL [facility] CERNER
This is a historical note converted from [employer]. Formatting and pictures may have been removed.  Please reference [employer] for original formatting and attached multimedia. Patient has been examined this morning. States that she has had one additional ER visit since her last office visit  for pain. This ER was in [place], [place].  S/p colonoscopy on  with hyperplastic polyp removed, no e/o malignancy.  She is feeling well today without any complaints. Can describe scheduled procedure in her own words after review of what this procedure entailed. Is aware that general surgery and urogynecology are aware and may have to present to OR if indicated.  ?   Presents today with her cousin Jyoti, #[phone].  ?   PE:  Vitals:  /104  HR 64  O2 99% on RA  T 36.8  ?   General: NAD, A/Ox3. Well appearing.  Respiratory: normal work of breathing  Cardiovascular: RRR  Abdomen: soft, nondistended, nontender to palpation.  Extremities: no edema, no calf tenderness  ?  Plan: To OR for scheduled Laparoscopic excision of pelvic mass, vaginal cuff revision, cystoscopy, possible oopherectomy, +/- trachelectomy, and any other indicated procedure. To receive scopolamine patch, tylenol 1000mg and gabapentin 300mg preop.  ?  Haritha Arce MD  [facility], PGY-3  ?  ?  ?  ?  ?  ?History of Present Illness  34yo  with Stage 4 endometriosis presenting for preoperative visit for Laparoscopic excision of pelvic mass, vaginal cuff revision, cystoscopy. Today, continues to complain of chronic pelvic pain. Has been taking Norco, Bentyl and Flexeril without relief. States that she is having both contninued vaginal spotting/bleeding and significant back pain. She remains compliant with her Orilissa, but has been unable to sleep well 2/2 this pain.  Since her last visit she has seen general surgery, who referred her for her colonoscopy.  Pt has a long-standing history of ED visits 2/2 this pain and vaginal bleeding. Endorsing that  she?is also having rectal bleeding today, is scheduled for colonoscopy tomm 9/26/2019.  She is s/p Flagyl rx for trichomonas, BV on wet prep?after last visit.  ?   Disease history:  4/2016: RA-LH/bilateral salpingectomy/laparoscopic lysis of adhesions/robotic assisted laparoscopic partial cystectomy?with Dr. Duenas and urology. After this procedure, pt continued to experience significant chronic pelvic pain and was eventually referred to [facility] for recurrent endometriosis noted at vaginal cuff apex, first seen in this clinic in Nov 2018.  02/2018: Dr. Duenas prescribed her Lupron in attempts to alleviate her endometriosis related pain but her insurance didnt cover it.  11/2018: Seen by [facility]?GYN: was prescribed?vaginal Valium and Robaxin to assist with hyperactive pelvic floor muscles. Also prescribed Orilissa for refractory endometriosis. Referred for MRI pelvis for further evaluation of endometrial implants.  MRI significant for possible endometriotic implant at posterior bladder, vaginal cuff, and?near surface of sigmoid. Of note, MRI also mentions cervix although patient s/p TLH. Referred to pelvic floor PT.  12/2018: Seen in [facility] clinic. Started Provera 10mg daily. Reported resumption of vaginal bleeding. Taking Robaxin with minimal improvement.  07/2019: Orilissa approved. Pt began taking it with mild improvement initially.  09/2019: Re-presented to [facility] with complaints of acutely worsening pelvic pain x1 week. Intermittent vaginal bleeding reported. MRI results reviewed, outlined below. evidence of potential vaginal cuff endometriosis implants. Referred to general surgery for evaluation and?to GI for?colonoscopy to r/o rectal bleeding given the fact that patient is s/p LH and endorsing continued intermittent spotting.  ?  GynHx:  12/R/5  s/p RALH as above  + trichomonas on wet prep this month s/p rx; denying other STIs  Denying history of abnormal pap smears. Last pap: 2/2016: NILM.  ?  ObHx:  FT   x2 (9#14)?  FT CS  ?  Social: Denying tobacco/alcohol/drugs.  Lives with her children (18, 14)  Works as a  @ [employer]  Review of Systems  Constitutional:?no fever, fatigue, weakness  Eye:?no vision loss  ENMT:?no sore throat  Respiratory:?no cough, no wheezing, no shortness of breath  Cardiovascular:?no chest pain, no palpitations, no edema  Gastrointestinal:?no nausea, vomiting, or diarrhea. + rectal bleeding  Genitourinary:?no dysuria, no urinary frequency. +pelvic pain and vaginal bleeding as above  Hema/Lymph:?no abnormal bruising or bleeding  Endocrine:?no heat or cold intolerance  Musculoskeletal:?no muscle pain, +SI joint pain  Integumentary:?no skin rash or abnormal lesion  Neurologic: no headaches  Physical Exam  ???Vitals & Measurements  ??HR:?85(Peripheral)? RR:?20? BP:?147/92?  ??HT:?160?cm? WT:?87?kg? BMI:?33.98?  General: A/Ox3. Well nourished but very distressed  Head: Atraumatic, normocephalic  Eyes: EOMI, No scleral icterus, normal conjunctivae  Respiratory: CTAB  Cardiovascular: RRR  Abdomen: soft, NTND  Skin: No rashes, warm and?dry  Extremities: no edema, no calf tenderness  ?   Exam, 2019:  Pelvis:  - External genitalia: Normal without lesions?  - Urethral meatus: Normal  - Bladder: No suprapubic tenderness  - Vaginal/pelvic support: Normal, moist vaginal mucosa without lesions, tenderness to palpation of bilateral levator ani, piriformis, obturator internus; exquisite tenderness upon palpation of vaginal cuff  - Cervix: No cervix visualized on exam although suboptimal visibility given patient discomfort  - Uterus: Surgically absent  - Adnexa: No fullness or masses  ?  Exam performed by Dr. Aleman [1]  Assessment/Plan  ?  32yo  with chronic pelvic pain, known h/o?deep infiltrating?endometriosis and?pelvic?floor dysfunction?scheduled for laparoscopic excision of pelvic mass, vaginal cuff revision, cystoscopy, any other indicated procedures?on 10/1/2019. Recent MRI in  09/2019 with potential endometriosis implants demonstrated involving patients?bladder dome, residual portion of cervix/vaginal cuff, +/- sigmoid colon.?S/p referral to general surgery with plan for Gen Surg +/- Urogynecology involvement intraoperatively. Pt is awaiting a colonoscopy to further work up rectal vs vaginal cuff spotting.  ?  Pt with significant pain on exam today, with difficulty walking. Prescribed Norco #10.  Prior operative report from [facility] reviewed.  ?  Counseling: Alternatives to this planned procedure were explained to the patient including expectant, medical management. At this pt patient has failed medical modalities of management and states that her pain is unbearable making it impossible for her to function and attend to activities of daily living. This procedure and its risks, reasons, benefits and complications (including injury to bowel, bladder, major blood vessel, ureter, bleeding, possibility of transfusion, infection, scarring, dyspareunia, erosion, further surgery, failure of the procedure or fistula formation) were reviewed in detail.?It was discussed with the patient that there is no guarantee that this procedure will resolve her pelvic pain entirely and that, if there is significant small or large intestine involvement, she may warrant resection/ intraop Gen Surg consultation. She is also aware that this surgery may lead to cystotomy if implants are noted to be on bladder. Also with risk of rectal involvement if mass seen on imaging extends from vaginal cuff to rectal vault. She is consented for above and any other indicated procedures.  ?  Patient counseled on risk of blood transfusion including but not limited to allergic reaction, transmission of HIV, Hep C 1:2 million, transmission of Hep B 1:250,000.  ?  Additionally, ovarian conservation versus elective risk reducing ovarian resection were discussed with the patient. She understands the risk for reoperation for ovarian  etiology to be 5-10%, the risk for ovarian cancer in women to be 1 in 70, and the protective effects of ovarian hormones including cardiovascular and bone health. After discussion, the patient desires ovarian conservation with the understanding that if any disease is suspected or bleeding that necessitates it - they may be removed.?  ?  Discussed typical postoperative course and expectations for postoperative pain.?Discussed return precautions and importance of postoperative visit at which point pathology would be reviewed.  ?  [facility] Appointment requested, surgical consents signed.  Pre-op labs?labs reviewed. Labs today: CBC, HIV/Hep panel/RPR  Labs AM of surgery: T&S  Tylenol 1000mg and Gabapentin 300mg AM of surgery. Scopolamine for nausea.  Patient counseled to remain NPO after midnight.  SCDs for DVT ppx.? Problem List/Past Medical History  Ongoing  ??Abnormal vaginal bleeding  ?Bladder endometriosis  ?Breast pain  ?Cigarette smoker  ?Endometriosis, pelvic peritoneum  ?Gastric reflux  ?HTN (hypertension)  ?Myalgia  ?Obesity  ?Pelvic pain  ?Uterine endometriosis  ?Vaginal endometriosis  Historical  ??Pregnant  ??Pregnant  ??Pregnant  Procedure/Surgical History  ?Cystectomy Laparoscopic Robotic Assist (.) (2016)  Dilation of Bilateral Ureters with Intraluminal Device, Via Natural or Artificial Opening Endoscopic (2016)  Excision of Bladder, Percutaneous Endoscopic Approach (2016)  Hysterectomy Laparoscopic Robotic Assist Si (.) (2016)  Release Uterus, Percutaneous Endoscopic Approach (2016)  Resection of Bilateral Fallopian Tubes, Via Natural or Artificial Opening With Percutaneous Endoscopic Assistance (2016)  Resection of Uterus, Via Natural or Artificial Opening With Percutaneous Endoscopic Assistance (2016)  Robotic Assisted Procedure of Trunk Region, Percutaneous Endoscopic Approach (2016)   section (1996)  biopsy - bladder  cholecystectomy  hernia  repair   ?  Medications  ?Flexeril 5 mg oral tablet, 5 mg= 1 tab(s), Oral, TID  ?ibuprofen 800 mg oral tablet, 800 mg= 1 tab(s), Oral, TID, 11 refills  ?lisinopril 10 mg oral tablet, 10 mg= 1 tab(s), Oral, Daily, 2 refills  ?Norco 5 mg-325 mg oral tablet, 1 tab(s), Oral, q4hr, PRN  ?Orilissa 150 mg oral tablet, 150 mg= 1 tab(s), Oral, Daily, 11 refills  Allergies  NKDA - No known drug allergies  Social History  Alcohol  ?Never, 04/25/2016  Employment/School  ?Employed, 09/11/2019  ?Employed, Activity level: Moderate physical work., 02/23/2018  Home/Environment  ?Lives with Children. Living situation: Home/Independent., 09/11/2019  Nutrition/Health  ?Regular, Good, 09/11/2019  Other  Sexual  ?Sexually active: Yes. No, 09/11/2019  ?Sexually active: Yes. Sexually active at age 14 Years. Number of current partners 1. Uses condoms: No. History of sexual abuse: No., 02/23/2018  Substance Use  ?Never, 04/25/2016  Tobacco  ?Former smoker, quit more than 30 days ago, N/A, 09/11/2019  ?Former smoker, quit more than 30 days ago, N/A, 05/01/2019  ?Former smoker, Cigarettes, N/A, 12/14/2018  Family History  Family history is negative  Immunizations  ??Vaccine ?Date ?Status   ?influenza virus vaccine, inactivated 11/28/2018 Given   ?  Lab Results  ??Test Name ?Test Result ?Date/Time ?Comments   ?Sodium Lvl 143 mmol/L 09/04/2019 10:40 CDT    ?Potassium Lvl 3.5 mmol/L 09/04/2019 10:40 CDT    ?Chloride 110 mmol/L (High) 09/04/2019 10:40 CDT    ?CO2 29 mmol/L 09/04/2019 10:40 CDT    ?Calcium Lvl 9.2 mg/dL 09/04/2019 10:40 CDT    ?Glucose Lvl 94 mg/dL 09/04/2019 10:40 CDT    ?BUN 12 mg/dL 09/04/2019 10:40 CDT    ?Creatinine 0.70 mg/dL 09/04/2019 10:40 CDT    ?BUN/Creat Ratio 17 09/04/2019 10:40 CDT    ?eGFR-AA >105 mL/min 09/04/2019 10:40 CDT    ?eGFR- mL/min 09/04/2019 10:40 CDT    ?UA Appear Clear 09/11/2019 12:00 CDT Result created by Discern Rule.   ?UA Color LIGHT YELLOW 09/11/2019 12:00 CDT    ?UA Spec Grav 1.023  09/11/2019 12:00 CDT    ?UA Bili Negative 09/11/2019 12:00 CDT Result created by Discern Rule.   ?UA pH 6.0 09/11/2019 12:00 CDT    ?UA Urobilinogen Normal 09/11/2019 12:00 CDT Result created by Discern Rule.   ?UA Blood 0.1 mg/dL 09/11/2019 12:00 CDT    ?UA Glucose 70 (Abnormal) 09/11/2019 12:00 CDT    ?UA Ketones Trace (Abnormal) 09/11/2019 12:00 CDT Result created by Discern Rule.   ?UA Protein 10 (Abnormal) 09/11/2019 12:00 CDT    ?UA Nitrite Negative 09/11/2019 12:00 CDT Result created by Discern Rule.   ?UA Leuk Est Negative 09/11/2019 12:00 CDT Result created by Discern Rule.   ?UA WBC Interp 0-2 09/11/2019 12:00 CDT Result created by Discern Rule.   ?UA RBC Interp 0-2 09/11/2019 12:00 CDT Result created by Discern Rule.   ?UA Bact Interp None Seen 09/11/2019 12:00 CDT    ?UA Squam Epi Interp  (Abnormal) 09/11/2019 12:00 CDT Result created by Discern Rule.   ?UA Hyal Cast Interp 0-2 (Abnormal) 09/11/2019 12:00 CDT Result created by Discern Rule.   ?  Diagnostic Results  MRI Pelvis w/wo contrast, 9/4/2019:  The postcontrast sequences are somewhat degraded by motion artifact  but are considered to be of diagnostic quality.  ?  When compared to the patients prior CT scan, the uterus is much  smaller and no longer anterior flexed. This suggests it subtotal  hysterectomy with potential sparing of the cervix and lower uterine  body. Hypointense signal is present along the bladder dome and the residual portion of the cervix/uterus, and to the serosa of the sigmoid colon on image 16 of series 5. This appears to be associated with some abnormal enhancement on image 31 of series 5 but, due to motion artifact, this is not definitively demonstrated.  ?  The vaginal vault does not appear thickened. The ovaries contain  simple appearing cysts bilaterally. On the left, the largest cyst  measures 2 cm in diameter. No blooming artifact or nodular enhancement seen to the ovaries suggest involvement with endometriosis. No  inguinal or pelvic lymphadenopathy identified. Asymmetric hypertrophic change to the sacroiliac joints. Bone marrow signal is essentially  unremarkable.  ?  IMPRESSION:  Hypointense signal and questionable blooming artifact noted to the  posterior dome of the bladder, the residual portion of the  cervix/vaginal cuff, and the antimesenteric surface of the sigmoid  colon. This is best seen on images 15 through 17 of series 5. This is  favored to represent hemosiderin deposition secondary to endometriosis  although no glandular elements were identified on the current study  and, given that the patient has undergone surgery to this location, it  would be difficult definitively distinguish this from postsurgical  artifact.  ?  Cystic changes are present to both ovaries. The largest cyst is  present in the left ovary and measures 2 cm in diameter.  ?  The majority of the uterus appears to have been surgically resected.  Please correlate this with the patients surgical history. Similarly,  the bladder neoplasm seen on the prior CT scan is not visible on the  current study and has likely been surgically resected as well.  ?   ?  [1]?[facility]necology Clinic Note; Elly Murphy 09/11/2019 17:05 CDT  Addendum by Haritha Arce MD on September 25, 2019 22:33:22 CDT (Verified)  Also consented for potential trachelectomy.  BPs noted to be elevated during this visit; 150s systolic/100s-110s diastolic. Pt endorsing compliance with antihypertensives. Will continue to monitor, potentially 2/2 significant discomfort on exam.  Addendum by Love BREEN, May S on September 26, 2019 08:03:15 CDT (Verified)  I have seen this patient and agree with note above.? She is in pain today and bleeding vaginally and rectally per her report.? We have treated her since 11/2018 and no resolution of symptoms with pelvic PT or Orilissa.? MRI shows a vaginal cuff mass- suspected to be endometrioma or cervix.? I do not see any bladder involvement  and this is stated in MRI report.? Sigmoid colon is near this area, however I do not suspect transmural sigmoid/colonic involvement since this was not present at hysterectomy and MRI does not suggest this.? Given her symptoms, she is having a colonoscopy today.?? IF there is mucosal mass, then we will rediscuss surgical plan with general surgery again.  ? I Have spoken with Dr. Clifford 9/25/19 as well and reiterated that I will call them if needed intraop.? Patient is aware that laparotomy may be performed if additional procedures are needed or for other reasons.  ?She does not want ovaries removed if possible-- we discussed that I may remove one if it is adherent or appears involved.? If this is an endometrioma, then risks exist of recurrence, persistent pain, or other and she is aware but does not want symptoms of menopause or other associated risks at this age.  I further explained that her pain has been chronic and other systems are involved (nervous, musculoskeletal), and that further PT may be necessary following this surgery along with other modalities.? SHe is ok with this plan and expresses understanding.  ?  Informed consent obtained, specifically I reiterated the risks of: pain, infection, bleeding, need for transfusion, need for laparotomy, damage to nearby organs and need for repair or additional surgery, delayed complications, other unpredicted risks.  Patient able to describe planned procedure in her own words.  Expected recovery time reviewed, as well as normal postoperative course. [1]  [1]?[facility] Preop Clinic Note; Yazmin BREEN, Haritha 09/25/2019 15:15 CDT   I have seen the patient today preoperatively and she is able to state procedure in her own words.? Changes to above H&P: umbilical hernia repair, Exam: horizontal umbilical scar along with laparoscopic scars.  I have reviewed plan: cystoscopy with ureteral stent placement, then laparoscopy.? I discussed that this possibly involves bladder or  bowel, and that general surgery is available if needed- they have seen her pre-operatively in clinic and performed colonoscopy.? I have reviewed colonoscopy results with the surgeons as well as MRI images again with the radiologist.?  I reviewed that this may not control all of her pain, and that we will likely need to employ other modalities to help with this (medications, PT).? Additionally, that?I will?recommend some medical suppression of endometriosis since this is recurrent in setting of prior resection.?  All questions were answered and she is ready to proceed.? ?  ?  ?

## 2022-05-02 NOTE — HISTORICAL [facility] CERNER
This is a historical note converted from [employer]. Formatting and pictures may have been removed.  Please reference [employer] for original formatting and attached multimedia. Indication for Surgery  intra-op consult for rectal mucosal disease  Preoperative Diagnosis  endometriosis involving posterior rectal wall  Postoperative Diagnosis  same, no mucosal disease  Operation  proctoscopy  Surgeon(s)  Jaelyn Caedt MD Available for entire case  Cory Mario, PGY 5  Assistant  none  Anesthesia  GETA  Estimated Blood Loss  none  Urine Output  see anesthesia  Findings  no evidence of mucosal disease?up to ?13 cm  no prep  rectum tethered beyond 15 cm  Specimen(s)  none  Complications  none  Technique  Anoscopy performed intra-op while patient in [facility] to evaluate rectal mucosal disease. A lubricated proctoscope was inserted into the anus and advanced to 15 cm. Using gentle air insufflation I was able to evaluate the majority of the mucosa not obscured by formed stool. I was unable to see any evidence of mucosal endometriosis but given the lack of rectal mobility I was unable to advance beyond 15cm. Likely this is reflective of serosal disease which was visualized during the intra-abdominal portion of the surgery.  This concluded the procedure  Cory Mario   I agree with resident documentation. I was physically present, supervised resident, ?and discussed plan of care.

## 2022-05-02 NOTE — HISTORICAL [facility] CERNER
This is a historical note converted from [employer]. Formatting and pictures may have been removed.  Please reference [employer] for original formatting and attached multimedia. Indication for Surgery  33yo  with Stage IV endometriosis, with chronic pelvic pain secondary to her endometriosis despite several attempted forms of medical management, and with concern for endometriotic implant at left lateral vaginal cuff, desiring definitive surgical management.  ?   Preoperative Diagnosis  1. Stage IV Endometriosis  2. Pelvic Adhesive Disease  3. Chronic Pelvic Pain  4. Class I Obesity  ?   Postoperative Diagnosis  1. Stage IV Endometriosis with Left Vaginal Cuff Endometriosis  2. Pelvic Adhesive Disease  3. Chronic Pelvic Pain  4. Class I Obesity  ?   Operation  Laparoscopic bilateral oophorectomy, Cystoscopy, Left ureteral stent placement and removal, Lysis of adhesions, Fulguration of left?vaginal cuff endometriosis, Vaginal cuff trigger point injection  ?  Staff:  Dr. Suzy Aleman  ?  Surgeon(s)  Dr. Haritha Arce (PGY-4)  ?  Assistant  Dr. Elly Murphy (PGY-3)  Dr. Martin Headley (PGY-2)  ?  Anesthesia  General Endotracheal  ?  Estimated Blood Loss  20cc  ?  Urine Output  100 cc clear yellow urine  ?  IVF:  1200cc?crystalloid  ?  Findings  EUA:  Uterus and cervix surgically absent. Right vaginal cuff without nodularity or lesions. Lateral left portion of vaginal cuff with nodularity, thickening noted on digital exam. Vaginal mucosa without lesions or masses; no adnexal masses or fullness appreciated. Vaginoscopy with normal appearing right vaginal cuff, several areas with concern for endometriosis lesions at lest vaginal cuff. Otherwise prior surgical areas well healed.  ?  Intraoperative:?No visceral or vascular injury noted upon?entry into?abdomen. Normal upper abdomen, liver edge with gallbladder, and greater curvature of stomach and omentum.?Appendix normal in appearance.?Significant?pelvic adhesive  disease noted?along?both right and left pelvic sidewall, with sigmoid colon adhesions to both left ovary and left pelvic sidewall and right?epiploic adhesions to right ovary and right pelvic sidewall. Uterus and bilateral fallopian tubes?surgically absent. Right vaginal cuff normal in appearance. Left vaginal cuff transilluminated with vaginal scope and no gross remaining?intraperitoneal endometriosis noted. Remainder of pelvis survey significant for anterior abdominal wall and pelvis sidewall scars from previous procedures, well healed. Umbilicus with?evidence of potential prior hernia repair with noted permanent suture.  ?  Cystoscopy:?Completed both at the beginning of the case as well as at case completion. Cystoscopy at beginning of the case with well healed area of prior bladder resection, no gross areas of endometriosis noted to be invading bladder. Left ureteral stent placed without issue, advanced to 20cm kamini. At completion of case, left ureteral stent removed without issue and bladder once again surveyed and noted to be free of trauma or injury. Brisk efflux of urine from bilateral ureteral orifices noted after administration of 10mg Lasix and IV?indigo carmine.  ?  Specimen(s)  Right and left ovary  ?  Complications  None  ?  Technique  The patient was taken to the operating room with IV fluids running. General anesthesia was obtained without difficulty. The patient was prepared and draped in the normal sterile fashion in the dorsal lithotomy position. SCDs were placed on bilateral lower extremities and turned on for DVT prophylaxis. A time out was performed. ?Exam under anesthesia was performed, with findings as noted above. Attention was first?paid?to the left ureteral stent?placement portion of the procedure. The 30 degree cystoscope was introduced into the bladder without difficulty. The bladder and urethra were inspected and noted to be intact, with no evidence of mass or lesion, findings as above.  The guide wire was passed through the ureteral stent?and tunneled through the cystoscope. The tip of the guide wire and stent could be visualized with the cystocope. The?ureteral?stent was carefully passed over the guide wire at the left ureteral orifice and advanced to approximately 20cm. Cystoscope was then removed, leaving the stent in place, and lizama catheter was inserted without difficulty. Silk suture was used to tie the stent to the lizama catheter.  ?  Attention was then turned to the laparoscopic portion of the procedure. Attention was turned to the patients abdomen. Lidocaine 1% was injected locally at [place] point: 2-3cm below the left costal margin, at the midclavicular line. An incision was made with the scalpel. The abdominal wall was tented upward and a 5 mm zero degree trocar was then introduced under direct visualization. All of the abdominal wall layers were identified. Upon entry into the abdomen, high flow CO2 gas was initiated to create a pneumoperitoneum, insufflated to a pressure of 15 mmHg. An abdominal survey was then performed with the aforementioned findings. The patient was placed in Trendelenburg position. Additional 5 mm trocars were then inserted in a similar manner at the?left and?right lower abdomen. A 12 mm trocar was inserted approximately 2cm superior to the umbilicus, given concern for prior hernia repair at the umbilicus. Care was taken to avoid area of permanent suture noted with umbilical trocar entry. The 5mm zero degree laparoscope was then exchanged for a 30 degree 10mm laparoscope.  ?  Attention was then turned to the left pelvic sidewall and left ovary. Atraumatic graspers and laparoscopic cold scissors were used to gently and carefully cut adhesions at the left pelvic sidewall. Left infundibulopelvic ligament was identified, peritoneum was tented upwards and an incision was made lateral to the infundibulopelvic ligament in an attempt to identify the left ureter. Despite  careful retroperitoneal dissection, left ureter was not identified at this portion of the procedure and decision was thus made to initially proceed with right oophorectomy. Attention was then turned to the right pelvic sidewall. Using atraumatic graspers and scissors, careful sharp dissection was performed to lyse adhesions between infundibulopelvic ligament, right pelvic sidewall and right ovary. The peritoneum was then?grasped and elevated?and scissors were used?to make an incision?in the peritoneum?lateral to the infundibulopelvic ligament, opening the retroperitoneal space parallel to the utero-ovarian-IP axis, using a push and spread technique.? After opening of the retroperitoneal space,?the ureter was?identified and noted to be peristalsing in the medial leaf of the broad ligament.?Blunt dissection was performed?to move the ureter away from the infundibulopelvic ligament.? Once?at the level of the pelvic brim,?the infundibulopelvic ligament was clamped, desiccated, and cut with the Ligasure device. Hemostasis was noted at the pedicle.  ?  Attention was then turned back to the left pelvic sidewall.?Careful blunt and sharp dissection was performed lateral to the previously identified infundibulopelvic ligament and at this time, the left ureter was identified and noted to be peristalsing - ureteral stent was noted to be in correct position. External and internal iliac vessels were identified. Ureter was noted to be coursing in the medial leaf of the posterior broad ligament. At the level of the pelvic brim, the IP ligament was skeletonized, clamped, desiccated and transected using the Ligasure device. Both specimens were placed in the anterior cul-de-sac until completion of the procedure.  ?  Attention was then turned to the left lateral vaginal cuff. Laparoscope was placed intravaginally and used to transilluminate left lateral vaginal cuff in an attempt to identify any remaining intraperitoneal endometriotic  lesions. Spinal needle was used to gently probe the left lateral cuff tissue depth. No remaining areas of endometriosis noted intra-peritoneally. Careful pelvic survey was once again performed so as to ensure that there were no remaining lesions of endometriosis, and none were identified. The pelvis was irrigated with no noted areas of bleeding. Hemostasis noted at bilateral infundibulopelvic ligament pedicles.  ?  Attention was turned back to the abdomen. An EndoCatch bag was introduced through the 12mm supra-umbilical port site and both specimens were placed into the bag and removed from the abdomen without difficulty. The trocar was?re-inserted to maintain abdominal insufflation. The?GraNee needle was then used to close the fascia at the 12mm laparoscopic trocar site.?5 manual breaths were administered by anesthesia. The?three remaining abdominal trocars removed under direct visualization. The abdomen was then desufflated.?The 10mm umbilical incision was then?closed using?running stitch of 3-0 Vicryl suture. The remaining three trocar incisions were closed with 3-0 Vicryl suture in a simple interrupted fashion. The incisions were covered with Dermabond.  ?  Ureteral stent was then removed without difficulty. The 30 degree cystoscope was then re-introduced into the bladder without difficulty. The bladder and urethra were inspected and noted to be intact, with no evidence of mass, lesion or trauma. There was?noted to be efflux of urine from bilateral ureteral orifices after administration of 10cc Lasix and indigo carmine. The cystoscope was removed and?the bladder was drained.  ?  Attention was lastly turned to the vagina.?Long weight speculum was placed into the posterior vagina and right angle retractor was used to elevate anterior portion of the vagina. Left lateral cuff was closely inspected and previously noted intravaginal areas of endometriosis at the left lateral vaginal cuff were fulgurated using Ligasure  device. Hemostasis?was noted. 20cc of 0.25% Bupivacaine was then injected circumferentially at the vaginal cuff. All instruments were then removed?from the vagina. The sponge and instrument counts were correct. The patient tolerated the procedure well. Patient went to the recovery room in good condition. Dr. Aleman was present and scrubbed for the entire procedure.??  ?  Haritha Arce MD  [facility], PGY-4   I was personally present and assisted throughout.? The left ovarian/IP dissection was difficult due to this being densely adherent to the bowel mesentery, left ovarian fossa (this was obliterated) and some involvement of the pelvic brim.? Adhesiolysis required approximately 20 minutes of additional dissection on the left to identify and isolate the left ureter.? Once this was seen, a window was created beneath the IP ligament to ensure that the ureter was well beneath the operative field.

## 2022-05-02 NOTE — HISTORICAL [facility] CERNER
This is a historical note converted from [employer]. Formatting and pictures may have been removed.  Please reference [employer] for original formatting and attached multimedia. Chief Complaint  33-year-old female with chronic pelvic?pain, endometriosis, rectal bleeding  History of Present Illness  33-year-old female with history of endometriosis?status post hysterectomy,?partial cystectomy,?laparoscopic ablations?is referred for colonoscopy.? In the last several months she has began to have rectal bleeding, every day?over the last 2 weeks or so. ?Bright red blood on the toilet paper, is not mixed in with the stool. ?She also has chronic pelvic pain, is migrated?to her rectum. ?She experiences this pain daily, not associated with?eating, or bowel movements.? She has?soft bowel movements every other day without straining which is normal for her.? Recent MRI shows an abnormality at the posterior dome of the bladder, vaginal cuff, and?antimesenteric border of the sigmoid colon. ?This is?believed to be hemosiderin deposits from endometriosis. ?She had a colonoscopy about 5 years ago for rectal bleeding,?was reportedly normal.? No family history of GI malignancy, aunt with colon polyps.  Review of Systems  NEgative except as per hpi  Physical Exam  General:?WD/WN, NAD, AAO x 3  Neck: supple, full ROM  Respiratory:?normal WOB on room air, LCTAB  CV:?RRR no m/r/g  ABD:?soft, TTP lower abdomen  Neurologic: cranial nerves 2-12 grossly?intact  Ext: no c/c/e  ?   MRI: Hypointense signal and questionable blooming artifact noted to the  posterior dome of the bladder, the residual portion of the  cervix/vaginal cuff, and the antimesenteric surface of the sigmoid  colon. This is best seen on images 15 through 17 of series 5. This is  favored to represent hemosiderin deposition secondary to endometriosis  although no glandular elements were identified on the current study  and, given that the patient has undergone surgery to this location,  it  would be difficult definitively distinguish this from postsurgical  artifact.  ?  ?  Assessment/Plan  33-year-old female with?severe endometriosis,?questionable involvement of the sigmoid colon, rectal bleeding  -Proceed with colonoscopy as scheduled   Problem List/Past Medical History  Ongoing  Abnormal vaginal bleeding  Bladder endometriosis  Breast pain  Cigarette smoker  Deep infiltrative endometriosis  Endometriosis, pelvic peritoneum  Female pelvic pain  Gastric reflux  HTN (hypertension)  Myalgia  Obesity  Pelvic pain  Preop examination  S/P laparoscopic hysterectomy  Uterine endometriosis  Vaginal endometriosis  Historical  Pregnant  Pregnant  Pregnant  Procedure/Surgical History  Cystectomy Laparoscopic Robotic Assist (.) (2016)  Dilation of Bilateral Ureters with Intraluminal Device, Via Natural or Artificial Opening Endoscopic (2016)  Excision of Bladder, Percutaneous Endoscopic Approach (2016)  Hysterectomy Laparoscopic Robotic Assist Si (.) (2016)  Release Uterus, Percutaneous Endoscopic Approach (2016)  Resection of Bilateral Fallopian Tubes, Via Natural or Artificial Opening With Percutaneous Endoscopic Assistance (2016)  Resection of Uterus, Via Natural or Artificial Opening With Percutaneous Endoscopic Assistance (2016)  Robotic Assisted Procedure of Trunk Region, Percutaneous Endoscopic Approach (2016)   section (1996)  biopsy - bladder  cholecystectomy  hernia repair   Medications  Inpatient  buffered lidocaine 2% - 0.5 ml syringe, 10 mg= 0.5 mL, Subcutaneous, As Directed  IVF Lactated Ringers LR Infusion 1,000 mL, 1000 mL, IV  Home  cyclobenzaprine 5 mg oral tablet, 5 mg= 1 tab(s), Oral, TID  ibuprofen 800 mg oral tablet, 800 mg= 1 tab(s), Oral, TID, 11 refills  lisinopril 10 mg oral tablet, 10 mg= 1 tab(s), Oral, Daily, 2 refills  Norco 5 mg-325 mg oral tablet, 1 tab(s), Oral, q4hr, PRN  Orilissa 150 mg oral tablet, 150 mg= 1  tab(s), Oral, Daily, 11 refills  Allergies  NKDA - No known drug allergies  Social History  Abuse/Neglect  No, 09/25/2019  No, No, Yes, 09/24/2019  Alcohol  Never, 04/25/2016  Employment/School  Employed, 09/11/2019  Employed, Activity level: Moderate physical work., 02/23/2018  Home/Environment  Lives with Children. Living situation: Home/Independent., 09/11/2019  Nutrition/Health  Regular, Good, 09/11/2019  Other  Sexual  Sexually active: Yes. No, 09/11/2019  Sexually active: Yes. Sexually active at age 14 Years. Number of current partners 1. Uses condoms: No. History of sexual abuse: No., 02/23/2018  Substance Use  Never, 04/25/2016  Tobacco  Never (less than 100 in lifetime), No, 09/25/2019  Former smoker, quit more than 30 days ago, Cigarettes, N/A, 09/24/2019  Family History  Acute myocardial infarction.: Mother.  Immunizations  Vaccine Date Status   influenza virus vaccine, inactivated 11/28/2018 Given

## 2022-05-02 NOTE — HISTORICAL [facility] CERNER
This is a historical note converted from [employer]. Formatting and pictures may have been removed.  Please reference [employer]ony for original formatting and attached multimedia. Chief Complaint  S/P RAMESH, Excision of endometrioma 10/1/19-complants of bleeding postoperative since 10/5/19.  History of Present Illness  34 yo s/p laparoscopic lysis of adhesions, excision of?vaginal cuff endometrioma,?vaginal cuff revision, laparoscopic closure, cystoscopy with?ureteral stent placement and removal, anoscopy at completion of case by General Surgery for deep, infiltrating endometriosis on 10/1/2019.  ?   Patient states she was doing well until she began to experience vaginal bleeding?on October 5th, saturating 12 pads a day. Patient states quality of bleeding changes, sometimes appears watery, at other times appears bright red blood. She also reports concurrent development of intense suprapubic pain stabbing in natures that occasionally radiates to the vagina. Reports occasional lightheadedness and dizziness while ambulating.  Tolerating a regular diet. Reports BM every other day, soft, no straining, no associated pain.  For pain, has been taking ibuprofen TID, gabapentin BID, occasionally lidocaine patches which do help. Ran out of percocet which was helping her while she was taking it. B&O suppositories were not covered by her insurance.  ?  Review of Systems  *Positive ROS are in bold, otherwise negative  ?   General: fevers/chills, fatigue  GI: abdominal pain, diarrhea, constipation  CV: chest pain, palpitations  Resp: SOB, cough  Gyn: bleeding,?abnormal discharge  : hematuria, dysuria, pain with bladder fullness  Neuro: headaches, weakness  MSK: joint?pain, back pain  Physical Exam  ???Vitals & Measurements  ??T:?36.8? ?C (Oral)? HR:?86(Peripheral)? RR:?20? BP:?148/95?  ??HT:?160?cm? WT:?87.6?kg? BMI:?34.22?  General Appearance: Alert, cooperative, no distress  Lungs: Respirations unlabored  Heart: Regular rate  Abdomen:  Soft, obese, suprapubic tenderness  Incisions:  Extremities: Extremities normal, atraumatic, no cyanosis or edema  SSE: normal external female genitalia, vaginal mucosa pink, moist without lesion, cuff examined, watery, serosanguineous discharge noted, cuff probed with q-tip and defect noted at left side of cuff  ?  Assessment/Plan  ?   34 yo s/p laparoscopic lysis of adhesions, excision of?vaginal cuff endometrioma,?vaginal cuff revision, laparoscopic closure, cystoscopy with?ureteral stent placement and removal, anoscopy at completion of case by General Surgery for deep, infiltrating endometriosis on 10/1/2019. Now with vaginal bleeding, cuff defect noted on exam.  ?   Admit for vaginal cuff repair with general anesthesia in OR.  CBC to be collected now.  Patient last ate at 8:30 AM, enchiladas with beans and rice.  Plan to keep patient overnight for observation.  ?  Patient seen and examined with Dr. Aleman. OB History  Pregnancy History???(3,0,0,3)?? ??  Pregnancy # 1?? ? ?**Marked as Sensitive**  Baby 1?????????????Outcome Date:?1996????? Outcome:?Live Birth  ???Outcome or Result:?  ???Gender:?Male????????Gest Age:?Fullterm ??????Wt:??3260 g  ???Hospital:?--????????[facility] Labor:?--  ???Renny Name:?--?????Babys Father:?--  ?  Pregnancy # 2  Baby 1?????????????Outcome Date:?2001????? Outcome:?Live Birth  ???Outcome or Result:?Vaginal  ???Gender:?Female????????Gest Age:?40 weeks ??????Wt:??3856 g  ???Hospital:?--????????[facility] Labor:?--  ???Renny Name:?--?????Babys Father:?--  ?  Pregnancy # 3?? ? ?**Marked as Sensitive**  Baby 1?????????????Outcome Date:?12/15/2004????? Outcome:?Live Birth  ???Outcome or Result:?Vaginal  ???Gender:?Female????????Gest Age:?39 weeks ??????Wt:??4337 g  ???Hospital:?--????????[facility] Labor:?--  ???Renny Name:?--?????Babys Father:?--  Problem List/Past Medical History  Ongoing  ??Abnormal vaginal bleeding  ?Bladder endometriosis  ?Breast  pain  ?Cigarette smoker  ?Deep infiltrative endometriosis  ?Endometriosis, pelvic peritoneum  ?Female pelvic pain  ?Gastric reflux  ?HTN (hypertension)  ?Hypertension  ?Myalgia  ?Obesity  ?Pelvic pain  ?Post-op pain  ?Preop examination  ?S/P laparoscopic hysterectomy  ?Uterine endometriosis  ?Vaginal endometriosis  Historical  ??Pregnant  ??Pregnant  ??Pregnant  Procedure/Surgical HistoryAnoscopy; diagnostic, including collection of specimen(s) by brushing or washing, when performed (separate procedure) (10/01/2019)  Cystoscopy (None) (10/01/2019)  Excision of Vagina, Via Natural or Artificial Opening (10/01/2019)  Excision of vaginal cyst or tumor (10/01/2019)  Fulgaration of Endometriosis (None) (10/01/2019)  Inspection of Lower Intestinal Tract, Via Natural or Artificial Opening Endoscopic (10/01/2019)  Lysis Of Adhesions (None) (10/01/2019)  Proctoscopy (None) (10/01/2019)  Release Sigmoid Colon, Percutaneous Endoscopic Approach (10/01/2019)  Revision of Vaginal cuff (None) (10/01/2019)  Stent Insertion/Removal (None) (10/01/2019)  Colonoscopy (None) (09/26/2019)  Colonoscopy, flexible; with biopsy, single or multiple (09/26/2019)  Colonoscopy, flexible; with removal of tumor(s), polyp(s), or other lesion(s) by snare technique (09/26/2019)  Excision of Sigmoid Colon, Via Natural or Artificial Opening Endoscopic, Diagnostic (09/26/2019)  Excision of Sigmoid Colon, Via Natural or Artificial Opening Endoscopic, Diagnostic (09/26/2019)  Polypectomy (None) (09/26/2019)  Cystectomy Laparoscopic Robotic Assist (.) (04/27/2016)  Dilation of Bilateral Ureters with Intraluminal Device, Via Natural or Artificial Opening Endoscopic (04/27/2016)  Excision of Bladder, Percutaneous Endoscopic Approach (04/27/2016)  Hysterectomy Laparoscopic Robotic Assist Si (.) (04/27/2016)  Release Uterus, Percutaneous Endoscopic Approach (04/27/2016)  Resection of Bilateral Fallopian Tubes, Via Natural or Artificial Opening With  Percutaneous Endoscopic Assistance (2016)  Resection of Uterus, Via Natural or Artificial Opening With Percutaneous Endoscopic Assistance (2016)  Robotic Assisted Procedure of Trunk Region, Percutaneous Endoscopic Approach (2016)   section (1996)  biopsy - bladder  cholecystectomy  hernia repair   ?  Medications  ?gabapentin 300 mg oral capsule, 300 mg= 1 cap(s), Oral, QHS Resp  ?lidocaine 5% topical film, 1 patch(es), TOP, Daily  ?lisinopril 10 mg oral tablet, 10 mg= 1 tab(s), Oral, Daily, 2 refills  ?Orilissa 150 mg oral tablet, 150 mg= 1 tab(s), Oral, Daily, 11 refills  Allergies  NKDA - No known drug allergies    Patient informed that last meal was actually at 11:00 am, ate a large piece of Laffy Taffy.  Will plan to go to OR for cuff repair tomorrow AM.

## 2023-04-25 NOTE — HISTORICAL [facility] CERNER
This is a historical note converted from [employer]. Formatting and pictures may have been removed.  Please reference [employer] for original formatting and attached multimedia. Indication for Surgery  Vaginal cuff dehiscence  Preoperative Diagnosis  Vaginal cuff dehiscence  Postoperative Diagnosis  Vaginal cuff dehiscence  Operation  Vaginal cuff revision  Surgeon(s)  Dr. Haritha Arce (PGY-3)  ?  Staff:  Dr. Isaac Cody  Assistant  Dr. Karyna Ramirez (PGY-4)  Anesthesia  General endotracheal  Estimated Blood Loss  10cc  ?  IVF: 1000cc  Urine Output  300cc light green urine after instillation of methylene blue  Findings  Intraop: Normal appearing external female genitalia, vaginal mucosa pink, moist without lesion. Vaginal cuff with defect approx 2-3 cm at left apex, with watery foul smelling discharge  Specimen(s)  Wound culture- aerobic, anaerobic  Complications  None  Technique  The patient was taken to the operating room with IVF running. SCDs were placed on bilateral lower extremities for DVT prophylaxis. General anesthesia was obtained without difficulty and found to be adequate. She was placed in the dorsal lithotomy position with legs in Yellofin type stirrups. Exam under anesthesia revealed the findings as above. She was prepped and draped in the normal sterile fashion. Douglas catheter was placed.  ?   A weighted speculum was placed into the posterior vaginal fornix. Right angled retractor used to elevate the anterior portion of the vagina. Wound culture was collected. The vaginal cuff was probed with back end of a pickup and defect noted as above. Four figure-of-eight stitches placed using 0-Vicryl suture. Cuff was once again gently digitally examined and with use of pickups and noted to be approximated along its entirety. [place] into the procedure, IV methylene blue was administered by anesthesia for assessment of potential ureteral fistula vs urinoma. No blue dye or fluid noted to leak through defect and  light blue/green urine noted in Douglas catheter at completion of case, assuring lack of abnormal ureteral connection to vaginal cavity.  ?   All instruments and sponge counts were correct x 2.?Patient was taken to recovery in stable condition.  Dr. Isaac Cody present and scrubbed entirety of case.   I was present with the resident during all critical and key portions of the procedure and agree with the findings documented in the residents note.   Topical Steroids Applications Pregnancy And Lactation Text: Most topical steroids are considered safe to use during pregnancy and lactation.  Any topical steroid applied to the breast or nipple should be washed off before breastfeeding.

## 2023-06-02 ENCOUNTER — OFFICE VISIT (OUTPATIENT)
Dept: GYNECOLOGY | Facility: CLINIC | Age: 38
End: 2023-06-02
Payer: MEDICAID

## 2023-06-02 VITALS
OXYGEN SATURATION: 100 % | BODY MASS INDEX: 32.43 KG/M2 | HEIGHT: 63 IN | DIASTOLIC BLOOD PRESSURE: 88 MMHG | TEMPERATURE: 98 F | SYSTOLIC BLOOD PRESSURE: 147 MMHG | RESPIRATION RATE: 18 BRPM | WEIGHT: 183 LBS

## 2023-06-02 DIAGNOSIS — N80.9 ENDOMETRIOSIS, UNSPECIFIED: Primary | ICD-10-CM

## 2023-06-02 PROCEDURE — 99213 OFFICE O/P EST LOW 20 MIN: CPT | Mod: PBBFAC

## 2023-06-02 RX ORDER — GABAPENTIN 300 MG/1
300 CAPSULE ORAL 3 TIMES DAILY
Qty: 90 CAPSULE | Refills: 3 | Status: SHIPPED | OUTPATIENT
Start: 2023-06-02 | End: 2024-06-01

## 2023-06-02 RX ORDER — TRAMADOL HYDROCHLORIDE 50 MG/1
50 TABLET ORAL EVERY 6 HOURS
Qty: 30 TABLET | Refills: 0 | Status: SHIPPED | OUTPATIENT
Start: 2023-06-02 | End: 2023-06-23

## 2023-06-02 RX ORDER — ONDANSETRON 4 MG/1
4 TABLET, ORALLY DISINTEGRATING ORAL EVERY 8 HOURS PRN
COMMUNITY
Start: 2023-02-22 | End: 2023-06-09

## 2023-06-02 RX ORDER — POLYETHYLENE GLYCOL 3350 17 G/17G
POWDER, FOR SOLUTION ORAL
COMMUNITY

## 2023-06-02 RX ORDER — CYCLOBENZAPRINE HCL 5 MG
5 TABLET ORAL NIGHTLY
Qty: 30 TABLET | Refills: 0 | Status: SHIPPED | OUTPATIENT
Start: 2023-06-02 | End: 2023-07-02

## 2023-06-02 RX ORDER — CITALOPRAM 20 MG/1
TABLET, FILM COATED ORAL
COMMUNITY
Start: 2023-03-24

## 2023-06-02 RX ORDER — GABAPENTIN 100 MG/1
CAPSULE ORAL
COMMUNITY
End: 2023-06-02

## 2023-06-02 RX ORDER — BISACODYL 5 MG
5 TABLET, DELAYED RELEASE (ENTERIC COATED) ORAL DAILY PRN
COMMUNITY

## 2023-06-02 NOTE — ASSESSMENT & PLAN NOTE
Due to severe pain with pelvic exam, difficult to view cuff and area of concern from ED.    Plan:  -- Continue doxy/flagyl for suspected infection  -- Given acute pain, will prescribe 1 month of Ultram and flexeril   -- Refill gabapentin, continue ibuprofen and tylenol  -- Repeat MRI given concern for possible recurrence of endometriosis at cuff  -- Also will place referral for Dr. Aguilar in [facility]    Return to care in 1 month for re-examination and review of MRI

## 2023-06-02 NOTE — PROGRESS NOTES
"[facility] NOTE  [facility]  [address] [place] [address]  Phone: [phone]  Fax: [phone]    Subjective:     Waldo Yu is a 37 y.o.  who presents complaining of vaginal & lower pelvic pain. Of note, patient has chronic pelvic pain 2/2 endometriosis (Dx in ), hysterectomy in 2016 and Hx of vaginal cuff pain. First vaginal cuff surgery in , most recent MRI in  with subsequent vaginal cuff revision surgery to remove associated adhesions.     Pelvic pain: Patient was without pain for 1 year after . Over the last year the pain has come back. She reports being admitted earlier this year for bowel obstruction relieved without surgery. Pt reports worsening pelvic pain and vaginal discharge first noticed about 1 week ago. She said the pain was a 10/10 and "felt like my pelvic bones were breaking." She went to the ED on 23 who diagnosed her with a vaginal cuff infection and prescribed antibiotics and norco. A CT was performed for suspected abscess however patient still hasn't been able to obtain results. Pt reports a tender bump on her vagina. She denies any vaginal bleeding since but reports persistent pain. Denies any dysuria or trouble urinating. Denies vaginal itchiness, menstrual-like cramping or vaginal spasms. Denies constipation or change in bowel habits.    Review of Systems  Denies fevers, chills, headache, blurry vision, dizziness, or syncope.   Denies chest pain, shortness of breath, RUQ pain, or calf pain.    OBHx:  OB History    Para Term  AB Living   4 3 3 0 1 3   SAB IAB Ectopic Multiple Live Births   1 0 0   3      # Outcome Date GA Lbr Bari/2nd Weight Sex Delivery Anes PTL Lv   4 Term 2004    F Vag-Spont   CATY   3 Term     F Vag-Spont   CATY   2 Term     M CS-Unspec   CATY   1 SAB         FD       GynHx:      GynHx: Partial Hysterectomy 2016   Hx of STIs - Trichomonas       Allergies  Review of patient's allergies indicates:  No " Known Allergies    MedHx:   Past Medical History:   Diagnosis Date    Endometriosis     Hypertension        SurgHx:   Past Surgical History:   Procedure Laterality Date     SECTION      hernia repair      PARTIAL HYSTERECTOMY      REPAIR OF VAGINAL CUFF         Medications:     Current Outpatient Medications:     bisacodyL (DULCOLAX) 5 mg EC tablet, Take 5 mg by mouth daily as needed., Disp: , Rfl:     citalopram (CELEXA) 20 MG tablet, , Disp: , Rfl:     lisinopriL 10 MG tablet, , Disp: , Rfl:     ondansetron (ZOFRAN-ODT) 4 MG TbDL, Take 4 mg by mouth every 8 (eight) hours as needed., Disp: , Rfl:     polyethylene glycol (GLYCOLAX) 17 gram/dose powder, polyethylene glycol 3350 17 gram/dose oral powder, Disp: , Rfl:     cyclobenzaprine (FLEXERIL) 5 MG tablet, Take 1 tablet (5 mg total) by mouth nightly., Disp: 30 tablet, Rfl: 0    gabapentin (NEURONTIN) 300 MG capsule, Take 1 capsule (300 mg total) by mouth 3 (three) times daily., Disp: 90 capsule, Rfl: 3    traMADoL (ULTRAM) 50 mg tablet, Take 1 tablet (50 mg total) by mouth every 6 (six) hours., Disp: 30 tablet, Rfl: 0    FM Hx: Denies hx of ovarian, uterine, endometrial, or colon cancer. Denies history of bleeding or coagulation disorders.  Family History   Problem Relation Age of Onset    Cancer Father     Hypertension Father     Diabetes Father     Hypertension Mother     Diabetes Mother     Diabetes Sister     Hypertension Sister        Social Hx: Denies tobacco, alcohol and illicit drug usage.  Social History     Socioeconomic History    Marital status: Unknown   Tobacco Use    Smoking status: Every Day     Types: Cigarettes    Smokeless tobacco: Current   Substance and Sexual Activity    Alcohol use: Not Currently    Drug use: Not Currently    Sexual activity: Yes     Partners: Male   Social History Narrative    ** Merged History Encounter **            Objective:     Vitals:    23 0806   BP: (!) 147/88   Resp: 18   Temp: 98.2 °F (36.8 °C)  "  TempSrc: Oral   SpO2: 100%   Weight: 83 kg (183 lb)   Height: 5' 3" (1.6 m)     Body mass index is 32.42 kg/m².    Physical Exam:     General: alert and oriented, in no acute distress  Lungs: clear to auscultation bilaterally, no conversational dyspnea  Heart: regular rate and rhythm  Abdomen: Soft, non-distended, non tender to palpation, no involuntary guarding, no rebound tenderness  Extremities: Normal, atraumatic, non-edematous, No cords or calf tenderness, No significant calf/ankle edema  External genitalia: Normal female genitalia without lesion, discharge or tenderness. Normal appearing urethral meatus. Normal appearing external anus.  Bimanual Exam: No pelvic lymphadenopathy noted bilaterally. Single digit exam with tenderness to bilateral pelvic floor and vaginal cuff.   Speculum Exam: Vaginal mucosa with copious white discharge. Difficult to identify lesion at cuff 2/2 tenderness with exam.   Note: RN chaperone present for entirety of exam.     Imaging  No images are attached to the encounter.  Assessment/Plan:    Waldo Yu is a 37 y.o.  with vaginal cuff infection and possible recurrence of endometriosis  Problem List Items Addressed This Visit          Renal/    Endometriosis, unspecified - Primary     Due to severe pain with pelvic exam, difficult to view cuff and area of concern from ED.    Plan:  -- Continue doxy/flagyl for suspected infection  -- Given acute pain, will prescribe 1 month of Ultram and flexeril   -- Refill gabapentin, continue ibuprofen and tylenol  -- Repeat MRI given concern for possible recurrence of endometriosis at cuff  -- Also will place referral for Dr. Aguilar in [facility]    Return to care in 1 month for re-examination and review of MRI         Relevant Orders    MRI Pelvis W WO Contrast       RTC in 1 month.    Future Appointments   Date Time Provider Department Center   2023  8:30 AM [facility] MRI1 350 LB LIMIT [facility] MRI [facility] Un   2023 10:30 AM Hernán ROBBINS" MD Jose [facility] [id number] [facility]ER [facility]   7/10/2023  8:45 AM [facility]ID[facility], [facility] GYN [facility] GYN Jadiel Matute       Patient and plan were discussed with Dr. Ross.    Johnathon Alexis MS3    Lata Vivas MS, MD  [facility] OB-GYN PGY-4  2:54 PM 06/02/2023

## 2023-06-05 ENCOUNTER — TELEPHONE (OUTPATIENT)
Dept: GYNECOLOGY | Facility: CLINIC | Age: 38
End: 2023-06-05
Payer: MEDICAID

## 2023-06-05 DIAGNOSIS — N80.9 ENDOMETRIOSIS: Primary | ICD-10-CM

## 2023-06-05 NOTE — TELEPHONE ENCOUNTER
Notified patient of referral to Dr Aguilar, phone number given, patient states that she knows address

## 2023-06-08 ENCOUNTER — TELEPHONE (OUTPATIENT)
Dept: GYNECOLOGY | Facility: CLINIC | Age: 38
End: 2023-06-08
Payer: MEDICAID

## 2023-06-08 NOTE — TELEPHONE ENCOUNTER
Above pt called stating that she has an infection in her vaginal cuff and the RX that was sent is not helping at all. Pt states she is having minor pain but is very uncomfortable. Pt states she will go to the ER, but she still would like a sooner appointment to be seen her next appointment is 7/10/23.

## 2023-06-09 ENCOUNTER — HOSPITAL ENCOUNTER (EMERGENCY)
Facility: HOSPITAL | Age: 38
Discharge: HOME | End: 2023-06-09
Attending: FAMILY MEDICINE
Payer: MEDICAID

## 2023-06-09 VITALS
DIASTOLIC BLOOD PRESSURE: 87 MMHG | TEMPERATURE: 99 F | HEIGHT: 63 IN | SYSTOLIC BLOOD PRESSURE: 143 MMHG | RESPIRATION RATE: 18 BRPM | HEART RATE: 93 BPM | BODY MASS INDEX: 32.03 KG/M2 | OXYGEN SATURATION: 100 % | WEIGHT: 180.75 LBS

## 2023-06-09 DIAGNOSIS — R10.2 VAGINAL PAIN: Primary | ICD-10-CM

## 2023-06-09 LAB
ALBUMIN SERPL-MCNC: 3.9 G/DL (ref 3.5–5)
ALBUMIN/GLOB SERPL: 1.2 RATIO (ref 1.1–2)
ALP SERPL-CCNC: 79 UNIT/L (ref 40–150)
ALT SERPL-CCNC: 14 UNIT/L (ref 0–55)
APPEARANCE UR: CLEAR
AST SERPL-CCNC: 11 UNIT/L (ref 5–34)
BACTERIA #/AREA URNS AUTO: ABNORMAL /HPF
BASOPHILS # BLD AUTO: 0.04 X10(3)/MCL
BASOPHILS NFR BLD AUTO: 0.8 %
BILIRUB UR QL STRIP.AUTO: NEGATIVE MG/DL
BILIRUBIN DIRECT+TOT PNL SERPL-MCNC: 0.2 MG/DL
BUN SERPL-MCNC: 9.1 MG/DL (ref 7–18.7)
C TRACH DNA SPEC QL NAA+PROBE: NOT DETECTED
CALCIUM SERPL-MCNC: 9.4 MG/DL (ref 8.4–10.2)
CHLORIDE SERPL-SCNC: 110 MMOL/L (ref 98–107)
CLUE CELLS VAG QL WET PREP: ABNORMAL
CO2 SERPL-SCNC: 25 MMOL/L (ref 22–29)
COLOR UR: ABNORMAL
CREAT SERPL-MCNC: 0.69 MG/DL (ref 0.55–1.02)
EOSINOPHIL # BLD AUTO: 0.15 X10(3)/MCL (ref 0–0.9)
EOSINOPHIL NFR BLD AUTO: 2.8 %
ERYTHROCYTE [DISTWIDTH] IN BLOOD BY AUTOMATED COUNT: 13.3 % (ref 11.5–17)
GFR SERPLBLD CREATININE-BSD FMLA CKD-EPI: >60 MLS/MIN/1.73/M2
GLOBULIN SER-MCNC: 3.2 GM/DL (ref 2.4–3.5)
GLUCOSE SERPL-MCNC: 109 MG/DL (ref 74–100)
GLUCOSE UR QL STRIP.AUTO: NORMAL MG/DL
HCT VFR BLD AUTO: 39.7 % (ref 37–47)
HGB BLD-MCNC: 12.9 G/DL (ref 12–16)
HYALINE CASTS #/AREA URNS LPF: ABNORMAL /LPF
IMM GRANULOCYTES # BLD AUTO: 0.01 X10(3)/MCL (ref 0–0.04)
IMM GRANULOCYTES NFR BLD AUTO: 0.2 %
KETONES UR QL STRIP.AUTO: NEGATIVE MG/DL
LACTATE SERPL-SCNC: 1.1 MMOL/L (ref 0.5–2.2)
LEUKOCYTE ESTERASE UR QL STRIP.AUTO: 25 UNIT/L
LIPASE SERPL-CCNC: 14 U/L
LYMPHOCYTES # BLD AUTO: 1.8 X10(3)/MCL (ref 0.6–4.6)
LYMPHOCYTES NFR BLD AUTO: 34.1 %
MCH RBC QN AUTO: 28.5 PG (ref 27–31)
MCHC RBC AUTO-ENTMCNC: 32.5 G/DL (ref 33–36)
MCV RBC AUTO: 87.6 FL (ref 80–94)
MONOCYTES # BLD AUTO: 0.5 X10(3)/MCL (ref 0.1–1.3)
MONOCYTES NFR BLD AUTO: 9.5 %
MUCOUS THREADS URNS QL MICRO: ABNORMAL /LPF
N GONORRHOEA DNA SPEC QL NAA+PROBE: NOT DETECTED
NEUTROPHILS # BLD AUTO: 2.78 X10(3)/MCL (ref 2.1–9.2)
NEUTROPHILS NFR BLD AUTO: 52.6 %
NITRITE UR QL STRIP.AUTO: NEGATIVE
NRBC BLD AUTO-RTO: 0 %
PH UR STRIP.AUTO: 6.5 [PH]
PLATELET # BLD AUTO: 186 X10(3)/MCL (ref 130–400)
PMV BLD AUTO: 10.8 FL (ref 7.4–10.4)
POTASSIUM SERPL-SCNC: 3.3 MMOL/L (ref 3.5–5.1)
PROT SERPL-MCNC: 7.1 GM/DL (ref 6.4–8.3)
PROT UR QL STRIP.AUTO: NEGATIVE MG/DL
RBC # BLD AUTO: 4.53 X10(6)/MCL (ref 4.2–5.4)
RBC #/AREA URNS AUTO: ABNORMAL /HPF
RBC UR QL AUTO: ABNORMAL UNIT/L
SODIUM SERPL-SCNC: 143 MMOL/L (ref 136–145)
SP GR UR STRIP.AUTO: 1.02
SQUAMOUS #/AREA URNS LPF: ABNORMAL /HPF
T VAGINALIS VAG QL WET PREP: ABNORMAL
UROBILINOGEN UR STRIP-ACNC: NORMAL MG/DL
WBC # SPEC AUTO: 5.28 X10(3)/MCL (ref 4.5–11.5)
WBC #/AREA URNS AUTO: ABNORMAL /HPF
WBC #/AREA VAG WET PREP: ABNORMAL
YEAST SPEC QL WET PREP: ABNORMAL

## 2023-06-09 PROCEDURE — 99285 EMERGENCY DEPT VISIT HI MDM: CPT | Mod: 25

## 2023-06-09 PROCEDURE — 87210 SMEAR WET MOUNT SALINE/INK: CPT | Performed by: PHYSICIAN ASSISTANT

## 2023-06-09 PROCEDURE — 80053 COMPREHEN METABOLIC PANEL: CPT | Performed by: PHYSICIAN ASSISTANT

## 2023-06-09 PROCEDURE — 83605 ASSAY OF LACTIC ACID: CPT | Performed by: PHYSICIAN ASSISTANT

## 2023-06-09 PROCEDURE — 25500020 PHARM REV CODE 255: Performed by: PHYSICIAN ASSISTANT

## 2023-06-09 PROCEDURE — 25000003 PHARM REV CODE 250: Performed by: PHYSICIAN ASSISTANT

## 2023-06-09 PROCEDURE — 85025 COMPLETE CBC W/AUTO DIFF WBC: CPT | Performed by: PHYSICIAN ASSISTANT

## 2023-06-09 PROCEDURE — 87591 N.GONORRHOEAE DNA AMP PROB: CPT | Performed by: PHYSICIAN ASSISTANT

## 2023-06-09 PROCEDURE — 83690 ASSAY OF LIPASE: CPT | Performed by: PHYSICIAN ASSISTANT

## 2023-06-09 PROCEDURE — 81001 URINALYSIS AUTO W/SCOPE: CPT | Performed by: PHYSICIAN ASSISTANT

## 2023-06-09 PROCEDURE — 96372 THER/PROPH/DIAG INJ SC/IM: CPT | Performed by: PHYSICIAN ASSISTANT

## 2023-06-09 PROCEDURE — 63600175 PHARM REV CODE 636 W HCPCS: Performed by: PHYSICIAN ASSISTANT

## 2023-06-09 RX ORDER — KETOROLAC TROMETHAMINE 10 MG/1
10 TABLET, FILM COATED ORAL EVERY 6 HOURS
Qty: 20 TABLET | Refills: 0 | Status: SHIPPED | OUTPATIENT
Start: 2023-06-09 | End: 2023-06-14

## 2023-06-09 RX ORDER — KETOROLAC TROMETHAMINE 30 MG/ML
60 INJECTION, SOLUTION INTRAMUSCULAR; INTRAVENOUS
Status: COMPLETED | OUTPATIENT
Start: 2023-06-09 | End: 2023-06-09

## 2023-06-09 RX ORDER — HYDROCODONE BITARTRATE AND ACETAMINOPHEN 10; 325 MG/1; MG/1
1 TABLET ORAL
Status: COMPLETED | OUTPATIENT
Start: 2023-06-09 | End: 2023-06-09

## 2023-06-09 RX ORDER — OXYCODONE AND ACETAMINOPHEN 5; 325 MG/1; MG/1
1 TABLET ORAL
Status: DISCONTINUED | OUTPATIENT
Start: 2023-06-09 | End: 2023-06-09

## 2023-06-09 RX ORDER — ONDANSETRON 8 MG/1
8 TABLET, ORALLY DISINTEGRATING ORAL EVERY 8 HOURS PRN
Qty: 12 TABLET | Refills: 0 | Status: SHIPPED | OUTPATIENT
Start: 2023-06-09

## 2023-06-09 RX ORDER — HYDROCODONE BITARTRATE AND ACETAMINOPHEN 10; 325 MG/1; MG/1
1 TABLET ORAL
Status: DISCONTINUED | OUTPATIENT
Start: 2023-06-09 | End: 2023-06-09

## 2023-06-09 RX ORDER — ONDANSETRON 4 MG/1
8 TABLET, ORALLY DISINTEGRATING ORAL
Status: COMPLETED | OUTPATIENT
Start: 2023-06-09 | End: 2023-06-09

## 2023-06-09 RX ADMIN — KETOROLAC TROMETHAMINE 60 MG: 30 INJECTION, SOLUTION INTRAMUSCULAR; INTRAVENOUS at 07:06

## 2023-06-09 RX ADMIN — HYDROCODONE BITARTRATE AND ACETAMINOPHEN 1 TABLET: 10; 325 TABLET ORAL at 09:06

## 2023-06-09 RX ADMIN — IOHEXOL 100 ML: 350 INJECTION, SOLUTION INTRAVENOUS at 08:06

## 2023-06-09 RX ADMIN — ONDANSETRON 8 MG: 4 TABLET, ORALLY DISINTEGRATING ORAL at 07:06

## 2023-06-10 NOTE — ED PROVIDER NOTES
Encounter Date: 2023       History     Chief Complaint   Patient presents with    Female  Problem     Pt reports treated last week for vaginal abscess per Dr. Ross, states abscess and pain are worsening, now c/o abd pain     38 YO AAF in ER with continued complaints of vaginal and lower abdominal pain. States she was recently seen in [place] and was being treated for a vaginal cuff infection. She has finished all of her antibiotics and feels as though the pain is worsening. She was also seen in GYN clinic at [facility] last week and will be referred to [place] for her chronic pelvic pain and possible revision of her vaginal cuff for the 2nd time. Pt having vaginal discharge and foul odor as well.     The history is provided by the patient.   Review of patient's allergies indicates:  No Known Allergies  Past Medical History:   Diagnosis Date    Endometriosis     Hypertension      Past Surgical History:   Procedure Laterality Date     SECTION      hernia repair      PARTIAL HYSTERECTOMY      REPAIR OF VAGINAL CUFF       Family History   Problem Relation Age of Onset    Cancer Father     Hypertension Father     Diabetes Father     Hypertension Mother     Diabetes Mother     Diabetes Sister     Hypertension Sister      Social History     Tobacco Use    Smoking status: Every Day     Types: Cigarettes    Smokeless tobacco: Current   Substance Use Topics    Alcohol use: Not Currently    Drug use: Not Currently     Review of Systems   Constitutional:  Negative for chills and fever.   HENT:  Negative for congestion and sore throat.    Respiratory:  Negative for shortness of breath.    Cardiovascular:  Negative for chest pain.   Gastrointestinal:  Negative for abdominal pain, diarrhea, nausea and vomiting.   Genitourinary:  Positive for pelvic pain, vaginal discharge and vaginal pain. Negative for dysuria, hematuria and vaginal bleeding.   Musculoskeletal:  Negative for back pain.   Skin:  Negative for  rash.   Neurological:  Negative for dizziness, weakness, light-headedness and headaches.   Hematological:  Does not bruise/bleed easily.   All other systems reviewed and are negative.    Physical Exam     Initial Vitals [06/09/23 1801]   BP Pulse Resp Temp SpO2   (!) 144/107 108 20 98.6 °F (37 °C) 100 %      MAP       --         Physical Exam    Nursing note and vitals reviewed.  Constitutional: She appears well-developed and well-nourished. She is not diaphoretic. No distress.   HENT:   Head: Normocephalic and atraumatic.   Nose: Nose normal.   Eyes: Conjunctivae are normal.   Cardiovascular:  Normal rate, regular rhythm, normal heart sounds and intact distal pulses.           Pulmonary/Chest: Breath sounds normal.   Abdominal: Abdomen is soft and flat. Bowel sounds are normal. There is abdominal tenderness in the right lower quadrant, suprapubic area and left lower quadrant.   No right CVA tenderness.  No left CVA tenderness.     There is no rebound and no guarding.   Genitourinary:    Pelvic exam was performed with patient supine.   There is no rash or lesion on the right labia. There is no rash or lesion on the left labia.    Vaginal discharge (white), erythema (mild) and tenderness present.      No vaginal bleeding.   There is erythema (mild) and tenderness in the vagina. No bleeding in the vagina.    No foreign body in the vagina.      Genitourinary Comments: Pain with bimanual exam, RN in room for chaperone     Musculoskeletal:         General: Normal range of motion.     Neurological: She is alert and oriented to person, place, and time. She has normal strength.   Skin: Skin is warm and dry.   Psychiatric: She has a normal mood and affect.       ED Course   Procedures  Labs Reviewed   WET PREP, GENITAL - Abnormal; Notable for the following components:       Result Value    WBC, Wet Prep Few (*)     All other components within normal limits   COMPREHENSIVE METABOLIC PANEL - Abnormal; Notable for the following  components:    Potassium Level 3.3 (*)     Chloride 110 (*)     Glucose Level 109 (*)     All other components within normal limits   URINALYSIS, REFLEX TO URINE CULTURE - Abnormal; Notable for the following components:    Blood, UA Trace (*)     Leukocyte Esterase, UA 25 (*)     Squamous Epithelial Cells, UA Few (*)     Mucous, UA Trace (*)     RBC, UA 6-10 (*)     All other components within normal limits   CBC WITH DIFFERENTIAL - Abnormal; Notable for the following components:    MCHC 32.5 (*)     MPV 10.8 (*)     All other components within normal limits   CHLAMYDIA/GONORRHOEAE(GC), PCR - Normal    Narrative:     The Xpert CT/NG test, performed on the [employer] system is a qualitative in vitro real-time polymerase chain reaction (PCR) test for the automated detected and differentiation for genomic DNA from Chlamydia trachomatis (CT) and/or Neisseria gonorrhoeae (NG).   LIPASE - Normal   LACTIC ACID, PLASMA - Normal   CBC W/ AUTO DIFFERENTIAL    Narrative:     The following orders were created for panel order CBC auto differential.  Procedure                               Abnormality         Status                     ---------                               -----------         ------                     CBC with Differential[[id number]]        Abnormal            Final result                 Please view results for these tests on the individual orders.   EXTRA TUBES    Narrative:     The following orders were created for panel order EXTRA TUBES.  Procedure                               Abnormality         Status                     ---------                               -----------         ------                     Light Blue Top Hold[[id number]]                              In process                 Gold Top Hold[[id number]]                                    In process                 Pink Top Hold[[id number]]                                    In process                   Please view results for these tests on  the individual orders.   LIGHT BLUE TOP HOLD   GOLD TOP HOLD   PINK TOP HOLD   EXTRA TUBES    Narrative:     The following orders were created for panel order EXTRA TUBES.  Procedure                               Abnormality         Status                     ---------                               -----------         ------                     Light Green Top Hold[[id number]]                             In process                   Please view results for these tests on the individual orders.   LIGHT GREEN TOP HOLD          Imaging Results              CT Abdomen Pelvis With Contrast (Final result)  Result time 06/10/23 08:37:09      Final result by Sourav Whitney MD (06/10/23 08:37:09)                   Impression:    Impression:    No acute intraabdominal or pelvic pathology is identified. Details and other findings as discussed above.    No significant discrepancy with overnight report.      Electronically signed by: Sourav Whitney  Date:    06/10/2023  Time:    08:37               Narrative:      Technique:CT of the abdomen and pelvis was performed with axial images as well as sagittal and coronal reconstruction images with intravenous contrast but without oral contrast.    Comparison:Correlated with the prior study dated 2023-06-09 20:49:07.    Clinical History:Abdominal pain,acute nonlocalized, female gu problem.    Dosage Information:Automated Exposure Control was utilized 266.10 mGy.cm.    Findings:    Lines and Tubes:None.    Thorax:    Lungs:The visualized lung bases appear unremarkable.    Pleura:No pleural effusion is seen.    Heart:The heart size is within normal limits.    Abdomen:    Abdominal Wall:No abdominal wall pathology is seen.    Liver:The liver appears unremarkable.    Biliary System:No intrahepatic or extrahepatic biliary duct dilatation is seen.    Gallbladder:Surgical clips are seen in the gallbladder fossa consistent with prior cholecystectomy.    Pancreas:The pancreas appears  unremarkable.    Spleen:The spleen appears unremarkable.    Adrenals:The adrenal glands appear unremarkable.    Kidneys:The left kidney appears unremarkable with no stones cysts masses or hydronephrosis. A single cyst measuring 5 mm is seen on series 2 image 50 in the mid pole of the right kidney for which no specific follow-up imaging is recommended.  The right kidney otherwise appears unremarkable with no stones masses or hydronephrosis identified.    Aorta:The abdominal aorta appears unremarkable.    Bowel:    Esophagus:The visualized esophagus appears unremarkable.    Stomach:The stomach appears unremarkable.    Duodenum:Unremarkable appearing duodenum.    Small Bowel:The small bowel appears unremarkable.    Colon:Nondistended.    Appendix:The appendix appears unremarkable (series 2 image ).    Peritoneum:No intraperitoneal free air or ascites is seen.    Pelvis:    Bladder:The bladder is nondistended and cannot be definitively evaluated.    Female:    Uterus:The uterus is surgically absent. The hypoechoic lesion in the region of the vaginal vault as seen on the prior ultrasound is not appreciated on the current examination.    Ovaries:No adnexal masses are seen.    Bony structures:    Dorsal Spine:The visualized dorsal spine appears unremarkable.    Bony Pelvis:The visualized bony structures of the pelvis appear unremarkable.                        Preliminary result by Sourav Whitney MD (06/09/23 22:07:27)                   Narrative:    START OF REPORT:  Technique: CT of the abdomen and pelvis was performed with axial images as well as sagittal and coronal reconstruction images with intravenous contrast but without oral contrast.    Comparison: Correlated with the prior study dated2023-06-09 20:49:07.    Clinical History: Abdominal pain,acute nonlocalized, female gu problem.    Dosage Information: Automated Exposure Control was utilized 266.10 mGy.cm.    Findings:  Lines and Tubes:  None.  Thorax:  Lungs: The visualized lung bases appear unremarkable.  Pleura: No pleural effusion is seen.  Heart: The heart size is within normal limits.  Abdomen:  Abdominal Wall: No abdominal wall pathology is seen.  Liver: The liver appears unremarkable.  Biliary System: No intrahepatic or extrahepatic biliary duct dilatation is seen.  Gallbladder: Surgical clips are seen in the gallbladder fossa consistent with prior cholecystectomy.  Pancreas: The pancreas appears unremarkable.  Spleen: The spleen appears unremarkable.  Adrenals: The adrenal glands appear unremarkable.  Kidneys: The left kidney appears unremarkable with no stones cysts masses or hydronephrosis. A single cyst measuring 5 mm is seen on series 2 image 50 in the mid pole of the right kidney. The right kidney otherwise appears unremarkable with no stones masses or hydronephrosis identified.  Aorta: The abdominal aorta appears unremarkable.  IVC: Unremarkable.  Bowel:  Esophagus: The visualized esophagus appears unremarkable.  Stomach: The stomach appears unremarkable.  Duodenum: Unremarkable appearing duodenum.  Small Bowel: The small bowel appears unremarkable.  Colon: Nondistended.  Appendix: The appendix appears unremarkable (series 2 image ).  Peritoneum: No intraperitoneal free air or ascites is seen.    Pelvis:  Bladder: The bladder is nondistended and cannot be definitively evaluated.  Female:  Uterus: The uterus is surgically absent. The hypoechoic lesion in the region of the vaginal vault as seen on the prior ultrasound is not appreciated on the current examination.  Ovaries: No adnexal masses are seen.    Bony structures:  Dorsal Spine: The visualized dorsal spine appears unremarkable.  Bony Pelvis: The visualized bony structures of the pelvis appear unremarkable.      Impression:  1. No acute intraabdominal or pelvic pathology is identified. Details and other findings as discussed above.                                         US  Pelvis Comp with Transvag NON-OB (xpd (Final result)  Result time 06/10/23 15:15:30      Final result by Miguel Reardon MD (06/10/23 15:15:30)                   Narrative:    EXAMINATION  US PELVIS COMP WITH TRANSVAG NON-OB (XPD)    CLINICAL HISTORY  vaginal pain;    LMP: Status post hysterectomy.    TECHNIQUE  Multiplanar grayscale sonographic evaluation of the pelvis was performed utilizing transabdominal and endovaginal approach, as well as focused Doppler interrogation.    COMPARISON  2 April 2020    FINDINGS  Exam quality: adequate for evaluation    The uterus is again not visualized.  There is similar somewhat heterogeneous and prominent appearance of the vaginal cuff, of otherwise limited assessment and nonspecific appearance.  Hypoechoic region at the cuff measures up to 2.5 cm in diameter, with no associated internal vascularity or surrounding hyperemia.  Overall appearance is indeterminate, but could be somewhat artifactual secondary to adjacent peristalsing bowel loops.    Neither ovary is convincingly visualized.  There is no evidence of expansile mass-like adnexal lesion.  There is trace simple appearing free fluid.  No evidence of loculated collection.    Limited views of the urinary bladder are unremarkable.    IMPRESSION  1. Heterogeneous and irregular appearance of the vaginal cuff, but no gross interval change from the April 2020 comparison.  Follow-up eval with gynecology recommended if there is ongoing clinical concern.  2. Nonvisualization of the ovaries.      Electronically signed by: Miguel Reardon  Date:    06/10/2023  Time:    15:15                      Preliminary result by Miguel Reardon MD (06/09/23 21:29:20)                   Narrative:    START OF REPORT:  Technique: Transabdominal and transvaginal ultrasound of the pelvis was performed.    Comparison: None.    CLINICAL HISTORY: Vaginal pain.    Findings:  Uterus: The uterus is surgically absent. The vaginal cuff appears heterogeneous.  A 2.5 x 1.7 x 2.3 cm heterogeneous predominantly hypoechoic lesion is seen in the region of vaginal cuff without significant vascularity within the lesion.  Adnexa: The ovaries are not visualized due to bowel gas.  Fluid: No free fluid is seen in the pelvis.      Impression:  1. The vaginal cuff appears heterogeneous. A 2.5 x 1.7 x 2.3 cm heterogeneous predominantly hypoechoic lesion is seen in the region of vaginal cuff without significant vascularity within the lesion. Correlate clinically as regards further evaluation and follow-up.  2. Details and other findings as noted above.                                         Medications   ketorolac injection 60 mg (60 mg Intramuscular Given 6/9/23 1908)   ondansetron disintegrating tablet 8 mg (8 mg Oral Given 6/9/23 1908)   iohexoL (OMNIPAQUE 350) injection 100 mL (100 mLs Intravenous Given 6/9/23 2007)   HYDROcodone-acetaminophen  mg per tablet 1 tablet (1 tablet Oral Given 6/9/23 2126)           APC / Resident Notes:   I was not physically present during the history or exam of this patient. I was available at all times for consultation. (Caty)      ED Course as of 06/12/23 1339   Fri Jun 09, 2023   2257 VSS, NAD, pt is non-toxic or ill appearing, labs and imaging reviewed with pt, she should follow up with GYN for her continued vaginal pain, no signs of infection at this time and antibiotics are not warranted, treatment plan and discharge instructions including follow up discussed, pt verbalized understanding, all questions answered, pt is stable and ready for discharge  [TT]      ED Course User Index  [TT] LI Willams                 Clinical Impression:   Final diagnoses:  [R10.2] Vaginal pain (Primary)        ED Disposition Condition    Discharge Stable          ED Prescriptions       Medication Sig Dispense Start Date End Date Auth. Provider    ondansetron (ZOFRAN-ODT) 8 MG TbDL Take 1 tablet (8 mg total) by mouth every 8 (eight) hours as needed  (nausea). 12 tablet 6/9/2023 -- LI Willams    ketorolac (TORADOL) 10 mg tablet Take 1 tablet (10 mg total) by mouth every 6 (six) hours. for 5 days 20 tablet 6/9/2023 6/14/2023 LI Willams          Follow-up Information       Follow up With Specialties Details Why Contact Info    [facility] Schedule an appointment as soon as possible for a visit in 3 days  [address] [facility] [id number] [phone]      [facility] Call on 6/12/2023 for a follow up [address] [id number] [phone]    [facility] Emergency Medicine In 3 days As needed, If symptoms worsen [address] [facility] [id number] [phone]             LI Willams  06/10/23 0049       Jacob Byrne MD  06/12/23 6905

## 2023-06-10 NOTE — DISCHARGE INSTRUCTIONS
Take all medications as prescribed.     Drink plenty of fluids and get a lot of rest.     Follow up with gyn and make sure to have your MRI done as scheduled on 6/22/23.    Return to ER for any changes or worsening of symptoms.

## 2023-06-12 ENCOUNTER — TELEPHONE (OUTPATIENT)
Dept: GYNECOLOGY | Facility: CLINIC | Age: 38
End: 2023-06-12
Payer: MEDICAID

## 2023-06-22 ENCOUNTER — HOSPITAL ENCOUNTER (OUTPATIENT)
Dept: RADIOLOGY | Facility: HOSPITAL | Age: 38
Discharge: HOME | End: 2023-06-22
Attending: STUDENT IN AN ORGANIZED HEALTH CARE EDUCATION/TRAINING PROGRAM
Payer: MEDICAID

## 2023-06-22 DIAGNOSIS — N80.9 ENDOMETRIOSIS, UNSPECIFIED: ICD-10-CM

## 2023-06-22 PROCEDURE — 25500020 PHARM REV CODE 255

## 2023-06-22 PROCEDURE — 72197 MRI PELVIS W/O & W/DYE: CPT | Mod: TC

## 2023-06-22 PROCEDURE — A9577 INJ MULTIHANCE: HCPCS

## 2023-06-22 RX ADMIN — GADOBENATE DIMEGLUMINE 18 ML: 529 INJECTION, SOLUTION INTRAVENOUS at 09:06

## 2023-06-23 DIAGNOSIS — N80.9 ENDOMETRIOSIS, UNSPECIFIED: Primary | ICD-10-CM

## 2023-06-23 RX ORDER — TRAMADOL HYDROCHLORIDE 50 MG/1
50 TABLET ORAL EVERY 8 HOURS PRN
Qty: 20 TABLET | Refills: 0 | Status: SHIPPED | OUTPATIENT
Start: 2023-06-23 | End: 2023-06-23

## 2023-06-23 RX ORDER — TRAMADOL HYDROCHLORIDE 50 MG/1
50 TABLET ORAL EVERY 6 HOURS PRN
Qty: 20 TABLET | Refills: 0 | Status: SHIPPED | OUTPATIENT
Start: 2023-06-23 | End: 2023-06-23

## 2023-06-23 RX ORDER — TRAMADOL HYDROCHLORIDE 50 MG/1
50 TABLET ORAL EVERY 6 HOURS PRN
Qty: 20 TABLET | Refills: 0 | Status: SHIPPED | OUTPATIENT
Start: 2023-06-23

## 2023-06-26 ENCOUNTER — TELEPHONE (OUTPATIENT)
Dept: GYNECOLOGY | Facility: CLINIC | Age: 38
End: 2023-06-26
Payer: MEDICAID

## 2023-06-26 NOTE — TELEPHONE ENCOUNTER
----- Message from Joanne Hatfield RN sent at 6/22/2023  9:22 AM CDT -----  Waldo Yu is requesting refill on Ultram.  Has appt with Jeff Espana, has one with us mid July.  Is having her MRI done as I type.  Thanks  ----- Message -----  From: Haley Anderson  Sent: 6/22/2023   9:06 AM CDT  To: [facility] Gynecology Clinical Support Staff    Patient requesting a refill on pain medication to be sent to pharmacy today.    Noted on med list that Dr Vivas sent on June 23rd.

## 2023-06-27 NOTE — PROGRESS NOTES
Chronic pelvic pain, h/o endometriosis s/p hyst, concern for endometriosis, possible scar tissue at cuff and bladder. Patient has follow-up with us and in with GYN in [place]

## 2023-06-28 ENCOUNTER — TELEPHONE (OUTPATIENT)
Dept: GYNECOLOGY | Facility: CLINIC | Age: 38
End: 2023-06-28
Payer: MEDICAID

## 2023-06-28 NOTE — TELEPHONE ENCOUNTER
Please advise. Thank you.       ----- Message from Marguerite Castro sent at 6/27/2023  3:55 PM CDT -----  Regarding: MRI results  The above patient called to find out if anyone has reviewed her results and would like a call back. Please advise, Thanks.

## 2023-06-29 ENCOUNTER — TELEPHONE (OUTPATIENT)
Dept: GYNECOLOGY | Facility: CLINIC | Age: 38
End: 2023-06-29
Payer: MEDICAID

## 2023-06-29 ENCOUNTER — PATIENT MESSAGE (OUTPATIENT)
Dept: GYNECOLOGY | Facility: CLINIC | Age: 38
End: 2023-06-29
Payer: MEDICAID

## 2023-06-29 NOTE — TELEPHONE ENCOUNTER
Patient returned call.  Gave her message from Dr Massey and appt dates and times for here and for Dr Aguilar

## 2023-07-10 ENCOUNTER — TELEPHONE (OUTPATIENT)
Dept: GYNECOLOGY | Facility: CLINIC | Age: 38
End: 2023-07-10
Payer: MEDICAID

## 2023-07-10 DIAGNOSIS — R10.2 CHRONIC PELVIC PAIN IN FEMALE: Primary | ICD-10-CM

## 2023-07-10 DIAGNOSIS — G89.29 CHRONIC PELVIC PAIN IN FEMALE: Primary | ICD-10-CM

## 2023-07-10 NOTE — TELEPHONE ENCOUNTER
38 yo PMH endometriosis s/p hysterectomy with imaging indicateive of scar tissue at cuff and no other endometriotic lesions on the pelvic MRI. Patient had called asking for results. Reviewed imaging studies and recommended follow-up in [place] as scheduled.    Patient concerned about chronic pain has asked for referral for pain clinic. Referral placed. Will message schedulers.

## 2023-07-11 ENCOUNTER — TELEPHONE (OUTPATIENT)
Dept: GYNECOLOGY | Facility: CLINIC | Age: 38
End: 2023-07-11
Payer: MEDICAID

## 2023-07-11 NOTE — TELEPHONE ENCOUNTER
----- Message from Joanne Hatfield RN sent at 7/11/2023  7:30 AM CDT -----  Regarding: RE: Pain referral  I'll call her and ask her which pain clinic because I don't know of any that take medicaid.  ----- Message -----  From: Melanie Massey MD  Sent: 7/10/2023   6:34 PM CDT  To: [facility] Gynecology Clinical Support Staff  Subject: Pain referral                                    Can we please try and get patient to be seen in pain clinic I have placed a referral Thank you    M asking patient to call me with pain clinic that she is interested in who takes her insurance and I will send referral

## 2023-07-18 ENCOUNTER — TELEPHONE (OUTPATIENT)
Dept: GYNECOLOGY | Facility: CLINIC | Age: 38
End: 2023-07-18
Payer: MEDICAID

## 2023-07-18 DIAGNOSIS — N80.9 ENDOMETRIOSIS: Primary | ICD-10-CM

## 2023-07-18 NOTE — TELEPHONE ENCOUNTER
Called patient to give her information on Pain managment referral to [facility] in [place].  Paperwork scanned into EPIC

## 2023-07-19 ENCOUNTER — TELEPHONE (OUTPATIENT)
Dept: GYNECOLOGY | Facility: CLINIC | Age: 38
End: 2023-07-19
Payer: MEDICAID

## 2023-07-19 NOTE — TELEPHONE ENCOUNTER
Notified patient that the Inter[facility] Pain therapy clinic denied referral, they do not treat endometriosis.  Did offer to send to another clinic if patient can find a clinic that takes medicaid.  Other suggestion was to call Dr Aguilar's office to move appt up sooner

## 2023-08-20 ENCOUNTER — HOSPITAL ENCOUNTER (EMERGENCY)
Facility: HOSPITAL | Age: 38
Discharge: LEFT BEFORE DISCHARGE | End: 2023-08-21
Payer: MEDICAID

## 2023-08-20 VITALS
OXYGEN SATURATION: 96 % | HEIGHT: 63 IN | RESPIRATION RATE: 20 BRPM | HEART RATE: 104 BPM | WEIGHT: 180 LBS | TEMPERATURE: 99 F | SYSTOLIC BLOOD PRESSURE: 175 MMHG | BODY MASS INDEX: 31.89 KG/M2 | DIASTOLIC BLOOD PRESSURE: 113 MMHG

## 2023-08-20 LAB
ALBUMIN SERPL-MCNC: 4 G/DL (ref 3.5–5)
ALBUMIN/GLOB SERPL: 1.3 RATIO (ref 1.1–2)
ALP SERPL-CCNC: 95 UNIT/L (ref 40–150)
ALT SERPL-CCNC: 14 UNIT/L (ref 0–55)
APPEARANCE UR: ABNORMAL
AST SERPL-CCNC: 14 UNIT/L (ref 5–34)
BACTERIA #/AREA URNS AUTO: ABNORMAL /HPF
BASOPHILS # BLD AUTO: 0.06 X10(3)/MCL
BASOPHILS NFR BLD AUTO: 1.1 %
BILIRUB SERPL-MCNC: 0.2 MG/DL
BILIRUB UR QL STRIP.AUTO: NEGATIVE
BUN SERPL-MCNC: 8 MG/DL (ref 7–18.7)
C TRACH DNA SPEC QL NAA+PROBE: NOT DETECTED
CALCIUM SERPL-MCNC: 9.5 MG/DL (ref 8.4–10.2)
CHLORIDE SERPL-SCNC: 112 MMOL/L (ref 98–107)
CO2 SERPL-SCNC: 22 MMOL/L (ref 22–29)
COLOR UR: YELLOW
CREAT SERPL-MCNC: 0.82 MG/DL (ref 0.55–1.02)
EOSINOPHIL # BLD AUTO: 0.13 X10(3)/MCL (ref 0–0.9)
EOSINOPHIL NFR BLD AUTO: 2.3 %
ERYTHROCYTE [DISTWIDTH] IN BLOOD BY AUTOMATED COUNT: 13.1 % (ref 11.5–17)
GFR SERPLBLD CREATININE-BSD FMLA CKD-EPI: >60 MLS/MIN/1.73/M2
GLOBULIN SER-MCNC: 3.2 GM/DL (ref 2.4–3.5)
GLUCOSE SERPL-MCNC: 161 MG/DL (ref 74–100)
GLUCOSE UR QL STRIP.AUTO: NEGATIVE
HCT VFR BLD AUTO: 39.7 % (ref 37–47)
HGB BLD-MCNC: 13.4 G/DL (ref 12–16)
IMM GRANULOCYTES # BLD AUTO: 0.02 X10(3)/MCL (ref 0–0.04)
IMM GRANULOCYTES NFR BLD AUTO: 0.4 %
KETONES UR QL STRIP.AUTO: NEGATIVE
LEUKOCYTE ESTERASE UR QL STRIP.AUTO: ABNORMAL
LYMPHOCYTES # BLD AUTO: 1.63 X10(3)/MCL (ref 0.6–4.6)
LYMPHOCYTES NFR BLD AUTO: 29 %
MCH RBC QN AUTO: 28.8 PG (ref 27–31)
MCHC RBC AUTO-ENTMCNC: 33.8 G/DL (ref 33–36)
MCV RBC AUTO: 85.2 FL (ref 80–94)
MONOCYTES # BLD AUTO: 0.38 X10(3)/MCL (ref 0.1–1.3)
MONOCYTES NFR BLD AUTO: 6.8 %
N GONORRHOEA DNA SPEC QL NAA+PROBE: NOT DETECTED
NEUTROPHILS # BLD AUTO: 3.4 X10(3)/MCL (ref 2.1–9.2)
NEUTROPHILS NFR BLD AUTO: 60.4 %
NITRITE UR QL STRIP.AUTO: NEGATIVE
NRBC BLD AUTO-RTO: 0 %
PH UR STRIP.AUTO: 6 [PH]
PLATELET # BLD AUTO: 219 X10(3)/MCL (ref 130–400)
PMV BLD AUTO: 11.9 FL (ref 7.4–10.4)
POTASSIUM SERPL-SCNC: 3.5 MMOL/L (ref 3.5–5.1)
PROT SERPL-MCNC: 7.2 GM/DL (ref 6.4–8.3)
PROT UR QL STRIP.AUTO: ABNORMAL
RBC # BLD AUTO: 4.66 X10(6)/MCL (ref 4.2–5.4)
RBC #/AREA URNS AUTO: 16 /HPF
RBC UR QL AUTO: ABNORMAL
SODIUM SERPL-SCNC: 145 MMOL/L (ref 136–145)
SOURCE (OHS): NORMAL
SP GR UR STRIP.AUTO: 1.03 (ref 1–1.03)
SQUAMOUS #/AREA URNS AUTO: 17 /HPF
T VAGINALIS GENITAL QL WET PREP: NORMAL
UROBILINOGEN UR STRIP-ACNC: 1
WBC # SPEC AUTO: 5.62 X10(3)/MCL (ref 4.5–11.5)
WBC #/AREA URNS AUTO: 5 /HPF

## 2023-08-20 PROCEDURE — 81001 URINALYSIS AUTO W/SCOPE: CPT | Performed by: NURSE PRACTITIONER

## 2023-08-20 PROCEDURE — 85025 COMPLETE CBC W/AUTO DIFF WBC: CPT | Performed by: NURSE PRACTITIONER

## 2023-08-20 PROCEDURE — 99900041 HC LEFT WITHOUT BEING SEEN- EMERGENCY

## 2023-08-20 PROCEDURE — 80053 COMPREHEN METABOLIC PANEL: CPT | Performed by: NURSE PRACTITIONER

## 2023-08-20 PROCEDURE — 87591 N.GONORRHOEAE DNA AMP PROB: CPT | Performed by: NURSE PRACTITIONER

## 2023-08-20 PROCEDURE — 87210 SMEAR WET MOUNT SALINE/INK: CPT | Performed by: NURSE PRACTITIONER

## 2023-09-02 ENCOUNTER — HOSPITAL ENCOUNTER (EMERGENCY)
Facility: HOSPITAL | Age: 38
Discharge: HOME | End: 2023-09-02
Attending: EMERGENCY MEDICINE
Payer: MEDICAID

## 2023-09-02 VITALS
RESPIRATION RATE: 20 BRPM | HEART RATE: 68 BPM | WEIGHT: 180 LBS | DIASTOLIC BLOOD PRESSURE: 96 MMHG | TEMPERATURE: 98 F | SYSTOLIC BLOOD PRESSURE: 154 MMHG | BODY MASS INDEX: 31.89 KG/M2 | OXYGEN SATURATION: 99 % | HEIGHT: 63 IN

## 2023-09-02 DIAGNOSIS — N80.9 ENDOMETRIOSIS: Primary | ICD-10-CM

## 2023-09-02 LAB
ANION GAP SERPL CALC-SCNC: 11 MEQ/L
APPEARANCE UR: CLEAR
BACTERIA #/AREA URNS AUTO: ABNORMAL /HPF
BASOPHILS # BLD AUTO: 0.02 X10(3)/MCL
BASOPHILS NFR BLD AUTO: 0.3 %
BILIRUB UR QL STRIP.AUTO: NEGATIVE
BUN SERPL-MCNC: 10.3 MG/DL (ref 7–18.7)
CALCIUM SERPL-MCNC: 10.1 MG/DL (ref 8.4–10.2)
CHLORIDE SERPL-SCNC: 111 MMOL/L (ref 98–107)
CO2 SERPL-SCNC: 22 MMOL/L (ref 22–29)
COLOR UR: YELLOW
CREAT SERPL-MCNC: 0.72 MG/DL (ref 0.55–1.02)
CREAT/UREA NIT SERPL: 14
EOSINOPHIL # BLD AUTO: 0.06 X10(3)/MCL (ref 0–0.9)
EOSINOPHIL NFR BLD AUTO: 1 %
ERYTHROCYTE [DISTWIDTH] IN BLOOD BY AUTOMATED COUNT: 13.2 % (ref 11.5–17)
GFR SERPLBLD CREATININE-BSD FMLA CKD-EPI: >60 MLS/MIN/1.73/M2
GLUCOSE SERPL-MCNC: 116 MG/DL (ref 74–100)
GLUCOSE UR QL STRIP.AUTO: NEGATIVE
HCT VFR BLD AUTO: 42.5 % (ref 37–47)
HGB BLD-MCNC: 13.3 G/DL (ref 12–16)
IMM GRANULOCYTES # BLD AUTO: 0.01 X10(3)/MCL (ref 0–0.04)
IMM GRANULOCYTES NFR BLD AUTO: 0.2 %
KETONES UR QL STRIP.AUTO: NEGATIVE
LEUKOCYTE ESTERASE UR QL STRIP.AUTO: ABNORMAL
LYMPHOCYTES # BLD AUTO: 1.54 X10(3)/MCL (ref 0.6–4.6)
LYMPHOCYTES NFR BLD AUTO: 26.2 %
MCH RBC QN AUTO: 27.4 PG (ref 27–31)
MCHC RBC AUTO-ENTMCNC: 31.3 G/DL (ref 33–36)
MCV RBC AUTO: 87.4 FL (ref 80–94)
MONOCYTES # BLD AUTO: 0.45 X10(3)/MCL (ref 0.1–1.3)
MONOCYTES NFR BLD AUTO: 7.7 %
NEUTROPHILS # BLD AUTO: 3.8 X10(3)/MCL (ref 2.1–9.2)
NEUTROPHILS NFR BLD AUTO: 64.6 %
NITRITE UR QL STRIP.AUTO: NEGATIVE
PH UR STRIP.AUTO: 6 [PH]
PLATELET # BLD AUTO: 189 X10(3)/MCL (ref 130–400)
PMV BLD AUTO: 11 FL (ref 7.4–10.4)
POTASSIUM SERPL-SCNC: 3.2 MMOL/L (ref 3.5–5.1)
PROT UR QL STRIP.AUTO: ABNORMAL
RBC # BLD AUTO: 4.86 X10(6)/MCL (ref 4.2–5.4)
RBC #/AREA URNS AUTO: ABNORMAL /HPF
RBC UR QL AUTO: ABNORMAL
SODIUM SERPL-SCNC: 144 MMOL/L (ref 136–145)
SP GR UR STRIP.AUTO: 1.02 (ref 1–1.03)
SQUAMOUS #/AREA URNS AUTO: ABNORMAL /HPF
UROBILINOGEN UR STRIP-ACNC: 0.2
WBC # SPEC AUTO: 5.88 X10(3)/MCL (ref 4.5–11.5)
WBC #/AREA URNS AUTO: ABNORMAL /HPF

## 2023-09-02 PROCEDURE — 81001 URINALYSIS AUTO W/SCOPE: CPT | Performed by: EMERGENCY MEDICINE

## 2023-09-02 PROCEDURE — 96375 TX/PRO/DX INJ NEW DRUG ADDON: CPT

## 2023-09-02 PROCEDURE — 85025 COMPLETE CBC W/AUTO DIFF WBC: CPT | Performed by: EMERGENCY MEDICINE

## 2023-09-02 PROCEDURE — 63600175 PHARM REV CODE 636 W HCPCS: Performed by: EMERGENCY MEDICINE

## 2023-09-02 PROCEDURE — 25000003 PHARM REV CODE 250: Performed by: EMERGENCY MEDICINE

## 2023-09-02 PROCEDURE — 99284 EMERGENCY DEPT VISIT MOD MDM: CPT | Mod: 25

## 2023-09-02 PROCEDURE — 96361 HYDRATE IV INFUSION ADD-ON: CPT

## 2023-09-02 PROCEDURE — 80048 BASIC METABOLIC PNL TOTAL CA: CPT | Performed by: EMERGENCY MEDICINE

## 2023-09-02 PROCEDURE — 96374 THER/PROPH/DIAG INJ IV PUSH: CPT

## 2023-09-02 RX ORDER — NAPROXEN 500 MG/1
500 TABLET ORAL 2 TIMES DAILY WITH MEALS
Qty: 30 TABLET | Refills: 0 | Status: SHIPPED | OUTPATIENT
Start: 2023-09-02 | End: 2023-09-17

## 2023-09-02 RX ORDER — HYDROCODONE BITARTRATE AND ACETAMINOPHEN 7.5; 325 MG/1; MG/1
1 TABLET ORAL EVERY 6 HOURS PRN
Qty: 10 TABLET | Refills: 0 | Status: SHIPPED | OUTPATIENT
Start: 2023-09-02

## 2023-09-02 RX ORDER — ONDANSETRON 2 MG/ML
4 INJECTION INTRAMUSCULAR; INTRAVENOUS ONCE
Status: COMPLETED | OUTPATIENT
Start: 2023-09-02 | End: 2023-09-02

## 2023-09-02 RX ORDER — POTASSIUM CHLORIDE 20 MEQ/1
20 TABLET, EXTENDED RELEASE ORAL
Status: COMPLETED | OUTPATIENT
Start: 2023-09-02 | End: 2023-09-02

## 2023-09-02 RX ORDER — HYDROMORPHONE HYDROCHLORIDE 2 MG/ML
1 INJECTION, SOLUTION INTRAMUSCULAR; INTRAVENOUS; SUBCUTANEOUS
Status: COMPLETED | OUTPATIENT
Start: 2023-09-02 | End: 2023-09-02

## 2023-09-02 RX ADMIN — SODIUM CHLORIDE 1000 ML: 9 INJECTION, SOLUTION INTRAVENOUS at 04:09

## 2023-09-02 RX ADMIN — HYDROMORPHONE HYDROCHLORIDE 1 MG: 2 INJECTION INTRAMUSCULAR; INTRAVENOUS; SUBCUTANEOUS at 04:09

## 2023-09-02 RX ADMIN — ONDANSETRON HYDROCHLORIDE 4 MG: 2 SOLUTION INTRAMUSCULAR; INTRAVENOUS at 04:09

## 2023-09-02 RX ADMIN — POTASSIUM CHLORIDE 20 MEQ: 1500 TABLET, EXTENDED RELEASE ORAL at 05:09

## 2023-09-02 NOTE — ED PROVIDER NOTES
Encounter Date: 2023       History     Chief Complaint   Patient presents with    Abdominal Pain     Pt c/o lower abd pain and vomiting x 2 days; reports she took toradol 3x today - last dose around 1330.      This 37-year-old female presents with lower abdominal pain in suprapubic area with associated nausea and vomiting for the past 2 days.  She states that she has endometriosis and she is scheduled for removal of her last remaining ovary on the  of this month.  She is here in town for the  of a family member.  She has taken the ketorolac and tramadol that she has with no relief.       Review of patient's allergies indicates:  No Known Allergies  Past Medical History:   Diagnosis Date    Endometriosis     Hypertension      Past Surgical History:   Procedure Laterality Date     SECTION      hernia repair      PARTIAL HYSTERECTOMY      REPAIR OF VAGINAL CUFF       Family History   Problem Relation Age of Onset    Cancer Father     Hypertension Father     Diabetes Father     Hypertension Mother     Diabetes Mother     Diabetes Sister     Hypertension Sister      Social History     Tobacco Use    Smoking status: Every Day     Types: Cigarettes    Smokeless tobacco: Current   Substance Use Topics    Alcohol use: Not Currently    Drug use: Not Currently     Review of Systems   Constitutional:  Negative for fever.   HENT:  Negative for sore throat.    Respiratory:  Negative for shortness of breath.    Cardiovascular:  Negative for chest pain.   Gastrointestinal:  Positive for abdominal pain, nausea and vomiting. Negative for constipation and diarrhea.   Genitourinary:  Negative for dysuria.   Musculoskeletal:  Negative for back pain.   Skin:  Negative for rash.   Neurological:  Negative for weakness.   Hematological:  Does not bruise/bleed easily.       Physical Exam     Initial Vitals [23 1540]   BP Pulse Resp Temp SpO2   (!) 154/106 (!) 118 18 98.4 °F (36.9 °C) 98 %      MAP       --          Physical Exam    Nursing note and vitals reviewed.  Constitutional: She appears well-developed and well-nourished.   HENT:   Head: Normocephalic and atraumatic.   Eyes: Conjunctivae and EOM are normal. Pupils are equal, round, and reactive to light.   Neck: Neck supple.   Normal range of motion.  Cardiovascular:  Normal rate, regular rhythm, normal heart sounds and intact distal pulses.           Pulmonary/Chest: Breath sounds normal.   Abdominal: Abdomen is soft. Bowel sounds are normal. There is abdominal tenderness (suprapubic). There is guarding.   Musculoskeletal:         General: Normal range of motion.      Cervical back: Normal range of motion and neck supple.     Neurological: She is alert and oriented to person, place, and time. She has normal strength.   Skin: Skin is warm and dry. Capillary refill takes less than 2 seconds.   Psychiatric: She has a normal mood and affect. Her behavior is normal. Judgment and thought content normal.         ED Course   Procedures  Labs Reviewed   URINALYSIS, REFLEX TO URINE CULTURE - Abnormal; Notable for the following components:       Result Value    Protein, UA Trace (*)     Blood, UA Small (*)     Leukocyte Esterase, UA Small (*)     All other components within normal limits   BASIC METABOLIC PANEL - Abnormal; Notable for the following components:    Potassium Level 3.2 (*)     Chloride 111 (*)     Glucose Level 116 (*)     All other components within normal limits   CBC WITH DIFFERENTIAL - Abnormal; Notable for the following components:    MCHC 31.3 (*)     MPV 11.0 (*)     All other components within normal limits   URINALYSIS, MICROSCOPIC - Abnormal; Notable for the following components:    WBC, UA 6-10 (*)     Squamous Epithelial Cells, UA Many (*)     All other components within normal limits   CBC W/ AUTO DIFFERENTIAL    Narrative:     The following orders were created for panel order CBC auto differential.  Procedure                                Abnormality         Status                     ---------                               -----------         ------                     CBC with Differential[[id number]]        Abnormal            Final result                 Please view results for these tests on the individual orders.          Imaging Results    None          Medications   sodium chloride 0.9% bolus 1,000 mL 1,000 mL (1,000 mLs Intravenous New Bag 9/2/23 1640)   potassium chloride SA CR tablet 20 mEq (has no administration in time range)   HYDROmorphone (PF) injection 1 mg (1 mg Intravenous Given 9/2/23 1639)   ondansetron injection 4 mg (4 mg Intravenous Given 9/2/23 1638)     Medical Decision Making  Amount and/or Complexity of Data Reviewed  Labs: ordered.  Discussion of management or test interpretation with external provider(s): Patient is feeling better after fluids and pain medicine.  We will give her enough to last until Tuesday when she can reach her own physician.     Risk  Prescription drug management.                               Clinical Impression:   Final diagnoses:  [N80.9] Endometriosis (Primary)        ED Disposition Condition    Discharge Stable          ED Prescriptions       Medication Sig Dispense Start Date End Date Auth. Provider    naproxen (NAPROSYN) 500 MG tablet Take 1 tablet (500 mg total) by mouth 2 (two) times daily with meals. for 15 days 30 tablet 9/2/2023 9/17/2023 Bong Rueda MD    HYDROcodone-acetaminophen (NORCO) 7.5-325 mg per tablet Take 1 tablet by mouth every 6 (six) hours as needed for Pain. 10 tablet 9/2/2023 -- Bong Rueda MD          Follow-up Information       Follow up With Specialties Details Why Contact Info    Keep your follow ups as planned for surgery later this month.                 Bong Rueda MD  09/02/23 3389

## 2023-09-06 NOTE — FIRST PROVIDER EVALUATION
"Medical screening examination initiated.  I have conducted a focused provider triage encounter, findings are as follows:    Brief history of present illness:  36 y/o female with low abdominal / pelvic pain for atleast a week. On flagyl for vaginal infection she was diagnosed with. Hx htn.     Vitals:    08/20/23 1826   BP: (!) 175/113   Pulse: 104   Resp: 20   Temp: 98.5 °F (36.9 °C)   TempSrc: Oral   SpO2: 96%   Weight: 81.6 kg (180 lb)   Height: 5' 3" (1.6 m)       Pertinent physical exam:  alert, ambulatory, nonlabored     Brief workup plan:  urine, cultures    Preliminary workup initiated; this workup will be continued and followed by the physician or advanced practice provider that is assigned to the patient when roomed.  "

## 2023-09-28 ENCOUNTER — HOSPITAL ENCOUNTER (EMERGENCY)
Facility: HOSPITAL | Age: 38
Discharge: HOME | End: 2023-09-28
Attending: EMERGENCY MEDICINE
Payer: MEDICAID

## 2023-09-28 VITALS
TEMPERATURE: 99 F | WEIGHT: 180 LBS | HEIGHT: 63 IN | OXYGEN SATURATION: 97 % | DIASTOLIC BLOOD PRESSURE: 99 MMHG | RESPIRATION RATE: 18 BRPM | SYSTOLIC BLOOD PRESSURE: 162 MMHG | HEART RATE: 84 BPM | BODY MASS INDEX: 31.89 KG/M2

## 2023-09-28 DIAGNOSIS — G89.29 CHRONIC BILATERAL LOWER ABDOMINAL PAIN: Primary | ICD-10-CM

## 2023-09-28 DIAGNOSIS — R10.31 CHRONIC BILATERAL LOWER ABDOMINAL PAIN: Primary | ICD-10-CM

## 2023-09-28 DIAGNOSIS — R10.32 CHRONIC BILATERAL LOWER ABDOMINAL PAIN: Primary | ICD-10-CM

## 2023-09-28 LAB
ALBUMIN SERPL-MCNC: 4.2 G/DL (ref 3.5–5)
ALBUMIN/GLOB SERPL: 1.2 RATIO (ref 1.1–2)
ALP SERPL-CCNC: 91 UNIT/L (ref 40–150)
ALT SERPL-CCNC: 15 UNIT/L (ref 0–55)
APPEARANCE UR: CLEAR
AST SERPL-CCNC: 14 UNIT/L (ref 5–34)
BACTERIA #/AREA URNS AUTO: NORMAL /HPF
BASOPHILS # BLD AUTO: 0.05 X10(3)/MCL
BASOPHILS NFR BLD AUTO: 1 %
BILIRUB SERPL-MCNC: 0.3 MG/DL
BILIRUB UR QL STRIP.AUTO: NEGATIVE
BUN SERPL-MCNC: 11.9 MG/DL (ref 7–18.7)
CALCIUM SERPL-MCNC: 9.7 MG/DL (ref 8.4–10.2)
CHLORIDE SERPL-SCNC: 109 MMOL/L (ref 98–107)
CO2 SERPL-SCNC: 24 MMOL/L (ref 22–29)
COLOR UR AUTO: NORMAL
CREAT SERPL-MCNC: 0.71 MG/DL (ref 0.55–1.02)
EOSINOPHIL # BLD AUTO: 0.13 X10(3)/MCL (ref 0–0.9)
EOSINOPHIL NFR BLD AUTO: 2.5 %
ERYTHROCYTE [DISTWIDTH] IN BLOOD BY AUTOMATED COUNT: 13.2 % (ref 11.5–17)
GFR SERPLBLD CREATININE-BSD FMLA CKD-EPI: >60 MLS/MIN/1.73/M2
GLOBULIN SER-MCNC: 3.4 GM/DL (ref 2.4–3.5)
GLUCOSE SERPL-MCNC: 112 MG/DL (ref 74–100)
GLUCOSE UR QL STRIP.AUTO: NEGATIVE
HCT VFR BLD AUTO: 42.2 % (ref 37–47)
HGB BLD-MCNC: 12.9 G/DL (ref 12–16)
IMM GRANULOCYTES # BLD AUTO: 0.01 X10(3)/MCL (ref 0–0.04)
IMM GRANULOCYTES NFR BLD AUTO: 0.2 %
KETONES UR QL STRIP.AUTO: NEGATIVE
LEUKOCYTE ESTERASE UR QL STRIP.AUTO: NEGATIVE
LYMPHOCYTES # BLD AUTO: 1.89 X10(3)/MCL (ref 0.6–4.6)
LYMPHOCYTES NFR BLD AUTO: 36.1 %
MCH RBC QN AUTO: 27.6 PG (ref 27–31)
MCHC RBC AUTO-ENTMCNC: 30.6 G/DL (ref 33–36)
MCV RBC AUTO: 90.2 FL (ref 80–94)
MONOCYTES # BLD AUTO: 0.4 X10(3)/MCL (ref 0.1–1.3)
MONOCYTES NFR BLD AUTO: 7.6 %
NEUTROPHILS # BLD AUTO: 2.76 X10(3)/MCL (ref 2.1–9.2)
NEUTROPHILS NFR BLD AUTO: 52.6 %
NITRITE UR QL STRIP.AUTO: NEGATIVE
PH UR STRIP.AUTO: 6 [PH]
PLATELET # BLD AUTO: 207 X10(3)/MCL (ref 130–400)
PMV BLD AUTO: 11.1 FL (ref 7.4–10.4)
POTASSIUM SERPL-SCNC: 3.8 MMOL/L (ref 3.5–5.1)
PROT SERPL-MCNC: 7.6 GM/DL (ref 6.4–8.3)
PROT UR QL STRIP.AUTO: NEGATIVE
RBC # BLD AUTO: 4.68 X10(6)/MCL (ref 4.2–5.4)
RBC #/AREA URNS AUTO: NORMAL /HPF
RBC UR QL AUTO: NEGATIVE
SODIUM SERPL-SCNC: 144 MMOL/L (ref 136–145)
SP GR UR STRIP.AUTO: 1.01 (ref 1–1.03)
SQUAMOUS #/AREA URNS AUTO: NORMAL /HPF
UROBILINOGEN UR STRIP-ACNC: 0.2
WBC # SPEC AUTO: 5.24 X10(3)/MCL (ref 4.5–11.5)
WBC #/AREA URNS AUTO: NORMAL /HPF

## 2023-09-28 PROCEDURE — 96375 TX/PRO/DX INJ NEW DRUG ADDON: CPT

## 2023-09-28 PROCEDURE — 85025 COMPLETE CBC W/AUTO DIFF WBC: CPT | Performed by: EMERGENCY MEDICINE

## 2023-09-28 PROCEDURE — 63600175 PHARM REV CODE 636 W HCPCS: Performed by: EMERGENCY MEDICINE

## 2023-09-28 PROCEDURE — 96361 HYDRATE IV INFUSION ADD-ON: CPT

## 2023-09-28 PROCEDURE — 99284 EMERGENCY DEPT VISIT MOD MDM: CPT | Mod: 25

## 2023-09-28 PROCEDURE — 96374 THER/PROPH/DIAG INJ IV PUSH: CPT

## 2023-09-28 PROCEDURE — 81001 URINALYSIS AUTO W/SCOPE: CPT | Performed by: EMERGENCY MEDICINE

## 2023-09-28 PROCEDURE — 80053 COMPREHEN METABOLIC PANEL: CPT | Performed by: EMERGENCY MEDICINE

## 2023-09-28 PROCEDURE — 25000003 PHARM REV CODE 250: Performed by: EMERGENCY MEDICINE

## 2023-09-28 RX ORDER — ONDANSETRON 2 MG/ML
4 INJECTION INTRAMUSCULAR; INTRAVENOUS ONCE
Status: COMPLETED | OUTPATIENT
Start: 2023-09-28 | End: 2023-09-28

## 2023-09-28 RX ORDER — HYDROMORPHONE HYDROCHLORIDE 2 MG/ML
1 INJECTION, SOLUTION INTRAMUSCULAR; INTRAVENOUS; SUBCUTANEOUS
Status: COMPLETED | OUTPATIENT
Start: 2023-09-28 | End: 2023-09-28

## 2023-09-28 RX ORDER — SODIUM CHLORIDE 9 MG/ML
1000 INJECTION, SOLUTION INTRAVENOUS
Status: COMPLETED | OUTPATIENT
Start: 2023-09-28 | End: 2023-09-28

## 2023-09-28 RX ADMIN — HYDROMORPHONE HYDROCHLORIDE 1 MG: 2 INJECTION INTRAMUSCULAR; INTRAVENOUS; SUBCUTANEOUS at 05:09

## 2023-09-28 RX ADMIN — SODIUM CHLORIDE 1000 ML: 9 INJECTION, SOLUTION INTRAVENOUS at 04:09

## 2023-09-28 RX ADMIN — ONDANSETRON 4 MG: 2 INJECTION INTRAMUSCULAR; INTRAVENOUS at 05:09

## 2023-09-28 NOTE — Clinical Note
"Waldo Yu (Alexis) was seen and treated in our emergency department on 9/28/2023.  She may return to work on 09/30/2023.       If you have any questions or concerns, please don't hesitate to call.      RT RN    "

## 2023-09-28 NOTE — ED PROVIDER NOTES
Encounter Date: 2023       History     Chief Complaint   Patient presents with    Fatigue     Pt presents with concerns of dehydration; states she believes she may be dehydrated because she is hurting so bad; pt reports a hx of endometriosis and has had several surgeries; pt reports dry mouth and lips and fatigue and states these symptoms occur every other day      This 37-year-old female presents to the emergency room concerned that she is dehydrated.  In addition she states she has pain over her bladder.  She states she has a history of endometriosis in his had several surgeries.  She missed her last appointment with gyn in [place] because she did not have the money to go.  She states she is dehydrated because her tongue and her lips are dry.  She states that every time she hydrates something sucks the fluid out of her body.  She states 4 hours ago she took a Tylenol No. 3 and 1 hour ago she took a Ketoralac.       Review of patient's allergies indicates:  No Known Allergies  Past Medical History:   Diagnosis Date    Endometriosis     Hypertension      Past Surgical History:   Procedure Laterality Date     SECTION      hernia repair      PARTIAL HYSTERECTOMY      REPAIR OF VAGINAL CUFF       Family History   Problem Relation Age of Onset    Cancer Father     Hypertension Father     Diabetes Father     Hypertension Mother     Diabetes Mother     Diabetes Sister     Hypertension Sister      Social History     Tobacco Use    Smoking status: Every Day     Types: Cigarettes    Smokeless tobacco: Current   Substance Use Topics    Alcohol use: Not Currently    Drug use: Not Currently     Review of Systems   Constitutional:  Negative for fever.   HENT:  Negative for sore throat.    Respiratory:  Negative for shortness of breath.    Cardiovascular:  Negative for chest pain.   Gastrointestinal:  Negative for nausea.   Genitourinary:  Negative for dysuria.   Musculoskeletal:  Negative for back pain.   Skin:   Negative for rash.   Neurological:  Negative for weakness.   Hematological:  Does not bruise/bleed easily.       Physical Exam     Initial Vitals [09/28/23 1537]   BP Pulse Resp Temp SpO2   (!) 161/124 101 16 99.1 °F (37.3 °C) 100 %      MAP       --         Physical Exam    Nursing note and vitals reviewed.  Constitutional: She appears well-developed and well-nourished.   HENT:   Head: Normocephalic and atraumatic.   Eyes: Conjunctivae and EOM are normal. Pupils are equal, round, and reactive to light.   Neck: Neck supple.   Normal range of motion.  Cardiovascular:  Normal rate, regular rhythm, normal heart sounds and intact distal pulses.           Pulmonary/Chest: Breath sounds normal.   Abdominal: Abdomen is soft. Bowel sounds are normal.   Musculoskeletal:         General: Normal range of motion.      Cervical back: Normal range of motion and neck supple.     Neurological: She is alert and oriented to person, place, and time. She has normal strength.   Skin: Skin is warm and dry. Capillary refill takes less than 2 seconds.   Psychiatric: She has a normal mood and affect. Her behavior is normal. Judgment and thought content normal.         ED Course   Procedures  Labs Reviewed   COMPREHENSIVE METABOLIC PANEL - Abnormal; Notable for the following components:       Result Value    Chloride 109 (*)     Glucose Level 112 (*)     All other components within normal limits   CBC WITH DIFFERENTIAL - Abnormal; Notable for the following components:    MCHC 30.6 (*)     MPV 11.1 (*)     All other components within normal limits   URINALYSIS, REFLEX TO URINE CULTURE - Normal   URINALYSIS, MICROSCOPIC - Normal   CBC W/ AUTO DIFFERENTIAL    Narrative:     The following orders were created for panel order CBC auto differential.  Procedure                               Abnormality         Status                     ---------                               -----------         ------                     CBC with  Differential[[id number]]        Abnormal            Final result                 Please view results for these tests on the individual orders.          Imaging Results    None          Medications   ondansetron injection 4 mg (has no administration in time range)   HYDROmorphone (PF) injection 1 mg (has no administration in time range)   0.9%  NaCl infusion (1,000 mLs Intravenous New Bag 9/28/23 3745)     Medical Decision Making  This 37-year-old presents with complaints chronic lower abdominal pain.  She has been in and out of gyn and has been referred to pain management.  Review of the  database shows 56 narcotic prescriptions from 28 different doctors over the past 2 years. Patient reports that she skipped her last GYN appointment. When I saw her last she told me she was having surgery to remove her last ovary on the 20th of this month. I have explained that I cannot write for any pain medications to go.     Amount and/or Complexity of Data Reviewed  Labs: ordered.    Risk  Prescription drug management.                               Clinical Impression:   Final diagnoses:  [R10.31, G89.29, R10.32] Chronic bilateral lower abdominal pain (Primary)        ED Disposition Condition    Discharge Stable          ED Prescriptions    None       Follow-up Information       Follow up With Specialties Details Why Contact Info    Daniela Lewis, FNP Emergency Medicine Schedule an appointment as soon as possible for a visit   [address] [facility] [id number] [phone]               Bong Rueda MD  09/28/23 1110

## 2023-09-29 ENCOUNTER — HOSPITAL ENCOUNTER (EMERGENCY)
Facility: HOSPITAL | Age: 38
Discharge: HOME | End: 2023-09-29
Attending: INTERNAL MEDICINE
Payer: MEDICAID

## 2023-09-29 VITALS
OXYGEN SATURATION: 99 % | WEIGHT: 178.56 LBS | BODY MASS INDEX: 31.63 KG/M2 | HEART RATE: 98 BPM | TEMPERATURE: 99 F | SYSTOLIC BLOOD PRESSURE: 148 MMHG | DIASTOLIC BLOOD PRESSURE: 88 MMHG | RESPIRATION RATE: 20 BRPM

## 2023-09-29 DIAGNOSIS — R10.2 PELVIC PAIN: Primary | ICD-10-CM

## 2023-09-29 DIAGNOSIS — R11.2 NAUSEA AND VOMITING, UNSPECIFIED VOMITING TYPE: ICD-10-CM

## 2023-09-29 LAB
APPEARANCE UR: CLEAR
BACTERIA #/AREA URNS AUTO: ABNORMAL /HPF
BILIRUB UR QL STRIP.AUTO: NEGATIVE
C TRACH DNA SPEC QL NAA+PROBE: NOT DETECTED
CLUE CELLS VAG QL WET PREP: ABNORMAL
COLOR UR AUTO: YELLOW
GLUCOSE UR QL STRIP.AUTO: NORMAL
HYALINE CASTS #/AREA URNS LPF: ABNORMAL /LPF
KETONES UR QL STRIP.AUTO: NEGATIVE
LEUKOCYTE ESTERASE UR QL STRIP.AUTO: NEGATIVE
MUCOUS THREADS URNS QL MICRO: ABNORMAL /LPF
N GONORRHOEA DNA SPEC QL NAA+PROBE: NOT DETECTED
NITRITE UR QL STRIP.AUTO: NEGATIVE
PH UR STRIP.AUTO: 7.5 [PH]
PROT UR QL STRIP.AUTO: ABNORMAL
RBC #/AREA URNS AUTO: ABNORMAL /HPF
RBC UR QL AUTO: NEGATIVE
SOURCE (OHS): NORMAL
SP GR UR STRIP.AUTO: 1.02 (ref 1–1.03)
SQUAMOUS #/AREA URNS LPF: ABNORMAL /HPF
T VAGINALIS VAG QL WET PREP: ABNORMAL
UNIDENT CRYS #/AREA URNS HPF: ABNORMAL /HPF
UROBILINOGEN UR STRIP-ACNC: ABNORMAL
WBC #/AREA URNS AUTO: ABNORMAL /HPF
WBC #/AREA VAG WET PREP: ABNORMAL
YEAST SPEC QL WET PREP: ABNORMAL

## 2023-09-29 PROCEDURE — 87491 CHLMYD TRACH DNA AMP PROBE: CPT | Performed by: INTERNAL MEDICINE

## 2023-09-29 PROCEDURE — 25000003 PHARM REV CODE 250

## 2023-09-29 PROCEDURE — 87210 SMEAR WET MOUNT SALINE/INK: CPT | Performed by: INTERNAL MEDICINE

## 2023-09-29 PROCEDURE — 81001 URINALYSIS AUTO W/SCOPE: CPT | Performed by: INTERNAL MEDICINE

## 2023-09-29 PROCEDURE — 87591 N.GONORRHOEAE DNA AMP PROB: CPT | Performed by: INTERNAL MEDICINE

## 2023-09-29 PROCEDURE — 99284 EMERGENCY DEPT VISIT MOD MDM: CPT

## 2023-09-29 RX ORDER — FLUCONAZOLE 150 MG/1
150 TABLET ORAL WEEKLY
Qty: 2 TABLET | Refills: 0 | Status: SHIPPED | OUTPATIENT
Start: 2023-09-29 | End: 2023-10-07

## 2023-09-29 RX ORDER — HYOSCYAMINE SULFATE 0.12 MG/1
0.12 TABLET SUBLINGUAL EVERY 6 HOURS PRN
Qty: 20 TABLET | Refills: 0 | Status: SHIPPED | OUTPATIENT
Start: 2023-09-29

## 2023-09-29 RX ORDER — OXYCODONE AND ACETAMINOPHEN 5; 325 MG/1; MG/1
2 TABLET ORAL
Status: COMPLETED | OUTPATIENT
Start: 2023-09-29 | End: 2023-09-29

## 2023-09-29 RX ORDER — METRONIDAZOLE 500 MG/1
500 TABLET ORAL 2 TIMES DAILY
Qty: 14 TABLET | Refills: 0 | Status: SHIPPED | OUTPATIENT
Start: 2023-09-29 | End: 2023-10-06

## 2023-09-29 RX ADMIN — OXYCODONE HYDROCHLORIDE AND ACETAMINOPHEN 2 TABLET: 5; 325 TABLET ORAL at 05:09

## 2023-09-29 NOTE — ED PROVIDER NOTES
Encounter Date: 2023       History     Chief Complaint   Patient presents with    Abdominal Pain     PT W CO LOWER ABD PAIN W NV X 1 WK.  NO DYSURIA, REPORTS YELLOW VAG DC.  SEEN AT [facility] YESTERDAY WITHOUT RELIEF.       Patient is a 37-year-old female with past medical history of endometriosis presents to the ED with abdominal pain.  Patient has multiple surgeries for endometriosis but states that she still has pain.  Over the last week pain has worsened and she describes it as someone stepping on her pelvis.  She was seen in the ED yesterday in [place] for the same complaint.  Per chart review patient has been prescribed multiple narcotics by multiple different providers over the past couple of years.  Patient was intended to have an appointment with GYN for procedure but missed it because her car broke down.  States that she was unable to reschedule her next appointment until January.  States that she is also experienced nausea and vomiting over the past week, no diarrhea.    The history is provided by the patient. No  was used.     Review of patient's allergies indicates:  No Known Allergies  Past Medical History:   Diagnosis Date    Endometriosis     Hypertension      Past Surgical History:   Procedure Laterality Date     SECTION      hernia repair      PARTIAL HYSTERECTOMY      REPAIR OF VAGINAL CUFF       Family History   Problem Relation Age of Onset    Cancer Father     Hypertension Father     Diabetes Father     Hypertension Mother     Diabetes Mother     Diabetes Sister     Hypertension Sister      Social History     Tobacco Use    Smoking status: Every Day     Types: Cigarettes    Smokeless tobacco: Current   Substance Use Topics    Alcohol use: Not Currently    Drug use: Not Currently     Review of Systems   Constitutional:  Negative for chills, fatigue and fever.   HENT:  Negative for congestion, sinus pressure, sinus pain and sore throat.    Respiratory:  Negative for  cough, shortness of breath and wheezing.    Cardiovascular:  Negative for chest pain, palpitations and leg swelling.   Gastrointestinal:  Positive for nausea and vomiting. Negative for abdominal distention, abdominal pain, constipation and diarrhea.   Genitourinary:  Positive for pelvic pain and vaginal discharge. Negative for frequency, hematuria, urgency and vaginal bleeding.       Physical Exam     Initial Vitals [09/29/23 1545]   BP Pulse Resp Temp SpO2   (!) 152/82 102 18 99 °F (37.2 °C) 100 %      MAP       --         Physical Exam    Nursing note and vitals reviewed.  Constitutional: She appears well-developed and well-nourished. She appears distressed.   HENT:   Head: Normocephalic and atraumatic.   Mouth/Throat: Oropharynx is clear and moist.   Eyes: Conjunctivae and EOM are normal. Pupils are equal, round, and reactive to light.   Neck: Neck supple. No JVD present.   Normal range of motion.  Cardiovascular:  Normal rate, regular rhythm, normal heart sounds and intact distal pulses.           No murmur heard.  Pulmonary/Chest: Breath sounds normal. No respiratory distress. She has no wheezes.   Abdominal: Abdomen is soft. Bowel sounds are normal. She exhibits no distension and no mass. There is no abdominal tenderness.   Genitourinary:    Vagina normal.   Cervix exhibits discharge (white).    No vaginal erythema.   No erythema in the vagina.    No signs of injury in the vagina.      Genitourinary Comments: Chaperone present during exam.     Musculoskeletal:         General: No tenderness or edema. Normal range of motion.      Cervical back: Normal range of motion and neck supple.     Neurological: She is alert and oriented to person, place, and time. No cranial nerve deficit.   Skin: Skin is warm and dry. No rash noted. No erythema.   Psychiatric: She has a normal mood and affect. Judgment and thought content normal.         ED Course   Procedures  Labs Reviewed   WET PREP, GENITAL - Abnormal; Notable for  the following components:       Result Value    WBC, Wet Prep Rare (*)     Clue Cells, Wet Prep Few (*)     All other components within normal limits   URINALYSIS - Abnormal; Notable for the following components:    Protein, UA Trace (*)     Urobilinogen, UA 1+ (*)     Squamous Epithelial Cells, UA Trace (*)     Mucous, UA Trace (*)     Unclassified Crystal, UA Trace (*)     All other components within normal limits   CHLAMYDIA/GONORRHOEAE(GC), PCR   POCT URINE PREGNANCY          Imaging Results    None          Medications   oxyCODONE-acetaminophen 5-325 mg per tablet 2 tablet (2 tablets Oral Given 9/29/23 1716)     Medical Decision Making  Patient is a 37-year-old female with past medical history of endometriosis presents to the ED with abdominal pain.  This has been a chronic problem for her.  Wet prep reveals clue cells.  On pelvic exam white discharge was visualized resembling candidiasis.  Discharge with Flagyl and Diflucan.  Patient is stable for discharge.  ED precautions provided.    Amount and/or Complexity of Data Reviewed  External Data Reviewed: labs and radiology.     Details: IMPRESSION  1. Heterogeneous and irregular appearance of the vaginal cuff, but no gross interval change from the April 2020 comparison.  Follow-up eval with gynecology recommended if there is ongoing clinical concern.  2. Nonvisualization of the ovaries.        Electronically signed by: Miguel Reardon  Date:                                            06/10/2023  Time:                                           15:15  Labs: ordered. Decision-making details documented in ED Course.    Risk  Prescription drug management.      Additional MDM:   Differential Diagnosis:   Urethritis, cystitis, pyelonephritis, herpes, candidiasis, among others              Attending Attestation:   Physician Attestation Statement for Resident:  As the supervising MD   Physician Attestation Statement: I have personally seen and examined this patient.   I  agree with the above history.  -:   As the supervising MD I agree with the above PE.     As the supervising MD I agree with the above treatment, course, plan, and disposition.    I have reviewed and agree with the residents interpretation of the following: lab data.  I have reviewed the following: old records at this facility and old CTs.                                Clinical Impression:   Final diagnoses:  [R11.2] Nausea and vomiting, unspecified vomiting type  [R10.2] Pelvic pain (Primary)        ED Disposition Condition    Discharge Stable          ED Prescriptions       Medication Sig Dispense Start Date End Date Auth. Provider    metroNIDAZOLE (FLAGYL) 500 MG tablet Take 1 tablet (500 mg total) by mouth 2 (two) times a day. for 7 days 14 tablet 9/29/2023 10/6/2023 Kain Gonzalez MD    fluconazole (DIFLUCAN) 150 MG Tab Take 1 tablet (150 mg total) by mouth once a week. for 2 doses 2 tablet 9/29/2023 10/7/2023 Kain Gonzalez MD    hyoscyamine (LEVSIN/SL) 0.125 mg Subl Place 1 tablet (0.125 mg total) under the tongue every 6 (six) hours as needed (Pelvic pain). 20 tablet 9/29/2023 -- Kain Gonzalez MD          Follow-up Information       Follow up With Specialties Details Why Contact Info    Daniela Lewis, [facility] Emergency Medicine In 1 month As needed [address] [facility] [id number]  [phone]      [facility] Emergency Medicine  If symptoms worsen [address] [facility] [address] [phone]             Kain Gonzalez MD  Resident  09/29/23 9656       Fer Mensah MD  09/29/23 8725

## 2023-10-02 ENCOUNTER — HOSPITAL ENCOUNTER (EMERGENCY)
Facility: HOSPITAL | Age: 38
Discharge: HOME | End: 2023-10-02
Attending: EMERGENCY MEDICINE
Payer: MEDICAID

## 2023-10-02 VITALS
HEIGHT: 63 IN | RESPIRATION RATE: 18 BRPM | HEART RATE: 77 BPM | BODY MASS INDEX: 31.99 KG/M2 | OXYGEN SATURATION: 100 % | DIASTOLIC BLOOD PRESSURE: 109 MMHG | TEMPERATURE: 99 F | SYSTOLIC BLOOD PRESSURE: 160 MMHG | WEIGHT: 180.56 LBS

## 2023-10-02 DIAGNOSIS — R10.30 LOWER ABDOMINAL PAIN: ICD-10-CM

## 2023-10-02 LAB
APPEARANCE UR: CLEAR
B-HCG UR QL: NEGATIVE
BACTERIA #/AREA URNS AUTO: ABNORMAL /HPF
BASOPHILS # BLD AUTO: 0.05 X10(3)/MCL
BASOPHILS NFR BLD AUTO: 0.7 %
BILIRUB UR QL STRIP.AUTO: NEGATIVE
C TRACH DNA SPEC QL NAA+PROBE: NOT DETECTED
COLOR UR AUTO: ABNORMAL
CTP QC/QA: YES
EOSINOPHIL # BLD AUTO: 0.13 X10(3)/MCL (ref 0–0.9)
EOSINOPHIL NFR BLD AUTO: 1.8 %
ERYTHROCYTE [DISTWIDTH] IN BLOOD BY AUTOMATED COUNT: 13.1 % (ref 11.5–17)
FLUAV AG UPPER RESP QL IA.RAPID: NOT DETECTED
FLUBV AG UPPER RESP QL IA.RAPID: NOT DETECTED
GLUCOSE UR QL STRIP.AUTO: NORMAL
HCT VFR BLD AUTO: 40.5 % (ref 37–47)
HGB BLD-MCNC: 13.1 G/DL (ref 12–16)
HYALINE CASTS #/AREA URNS LPF: ABNORMAL /LPF
IMM GRANULOCYTES # BLD AUTO: 0.01 X10(3)/MCL (ref 0–0.04)
IMM GRANULOCYTES NFR BLD AUTO: 0.1 %
KETONES UR QL STRIP.AUTO: NEGATIVE
LEUKOCYTE ESTERASE UR QL STRIP.AUTO: NEGATIVE
LIPASE SERPL-CCNC: 14 U/L
LYMPHOCYTES # BLD AUTO: 2.54 X10(3)/MCL (ref 0.6–4.6)
LYMPHOCYTES NFR BLD AUTO: 34.5 %
MCH RBC QN AUTO: 28.1 PG (ref 27–31)
MCHC RBC AUTO-ENTMCNC: 32.3 G/DL (ref 33–36)
MCV RBC AUTO: 86.9 FL (ref 80–94)
MONOCYTES # BLD AUTO: 0.52 X10(3)/MCL (ref 0.1–1.3)
MONOCYTES NFR BLD AUTO: 7.1 %
MUCOUS THREADS URNS QL MICRO: ABNORMAL /LPF
N GONORRHOEA DNA SPEC QL NAA+PROBE: NOT DETECTED
NEUTROPHILS # BLD AUTO: 4.11 X10(3)/MCL (ref 2.1–9.2)
NEUTROPHILS NFR BLD AUTO: 55.8 %
NITRITE UR QL STRIP.AUTO: NEGATIVE
NRBC BLD AUTO-RTO: 0 %
PH UR STRIP.AUTO: 5.5 [PH]
PLATELET # BLD AUTO: 220 X10(3)/MCL (ref 130–400)
PMV BLD AUTO: 11.3 FL (ref 7.4–10.4)
PROT UR QL STRIP.AUTO: NEGATIVE
RBC # BLD AUTO: 4.66 X10(6)/MCL (ref 4.2–5.4)
RBC #/AREA URNS AUTO: ABNORMAL /HPF
RBC UR QL AUTO: ABNORMAL
SARS-COV-2 RNA RESP QL NAA+PROBE: NOT DETECTED
SOURCE (OHS): NORMAL
SP GR UR STRIP.AUTO: 1.01 (ref 1–1.03)
SQUAMOUS #/AREA URNS LPF: ABNORMAL /HPF
UROBILINOGEN UR STRIP-ACNC: NORMAL
WBC # SPEC AUTO: 7.36 X10(3)/MCL (ref 4.5–11.5)
WBC #/AREA URNS AUTO: ABNORMAL /HPF

## 2023-10-02 PROCEDURE — 96372 THER/PROPH/DIAG INJ SC/IM: CPT | Performed by: PHYSICIAN ASSISTANT

## 2023-10-02 PROCEDURE — 83690 ASSAY OF LIPASE: CPT | Performed by: PHYSICIAN ASSISTANT

## 2023-10-02 PROCEDURE — 81001 URINALYSIS AUTO W/SCOPE: CPT | Performed by: PHYSICIAN ASSISTANT

## 2023-10-02 PROCEDURE — 85025 COMPLETE CBC W/AUTO DIFF WBC: CPT | Performed by: PHYSICIAN ASSISTANT

## 2023-10-02 PROCEDURE — 25000003 PHARM REV CODE 250: Performed by: PHYSICIAN ASSISTANT

## 2023-10-02 PROCEDURE — 0240U COVID/FLU A&B PCR: CPT | Performed by: PHYSICIAN ASSISTANT

## 2023-10-02 PROCEDURE — 63600175 PHARM REV CODE 636 W HCPCS: Performed by: PHYSICIAN ASSISTANT

## 2023-10-02 PROCEDURE — 87591 N.GONORRHOEAE DNA AMP PROB: CPT | Performed by: PHYSICIAN ASSISTANT

## 2023-10-02 PROCEDURE — 81025 URINE PREGNANCY TEST: CPT | Performed by: PHYSICIAN ASSISTANT

## 2023-10-02 PROCEDURE — 99284 EMERGENCY DEPT VISIT MOD MDM: CPT

## 2023-10-02 RX ORDER — KETOROLAC TROMETHAMINE 30 MG/ML
30 INJECTION, SOLUTION INTRAMUSCULAR; INTRAVENOUS
Status: COMPLETED | OUTPATIENT
Start: 2023-10-02 | End: 2023-10-02

## 2023-10-02 RX ORDER — MORPHINE SULFATE 2 MG/ML
2 INJECTION, SOLUTION INTRAMUSCULAR; INTRAVENOUS
Status: COMPLETED | OUTPATIENT
Start: 2023-10-02 | End: 2023-10-02

## 2023-10-02 RX ORDER — ADHESIVE BANDAGE
30 BANDAGE TOPICAL
Status: COMPLETED | OUTPATIENT
Start: 2023-10-02 | End: 2023-10-02

## 2023-10-02 RX ORDER — LACTULOSE 10 G/15ML
20 SOLUTION ORAL
Status: COMPLETED | OUTPATIENT
Start: 2023-10-02 | End: 2023-10-02

## 2023-10-02 RX ADMIN — LACTULOSE 20 G: 20 SOLUTION ORAL at 08:10

## 2023-10-02 RX ADMIN — MAGNESIUM HYDROXIDE 2400 MG: 400 SUSPENSION ORAL at 08:10

## 2023-10-02 RX ADMIN — MORPHINE SULFATE 2 MG: 2 INJECTION, SOLUTION INTRAMUSCULAR; INTRAVENOUS at 07:10

## 2023-10-02 RX ADMIN — KETOROLAC TROMETHAMINE 30 MG: 30 INJECTION, SOLUTION INTRAMUSCULAR; INTRAVENOUS at 07:10

## 2023-10-03 ENCOUNTER — HOSPITAL ENCOUNTER (EMERGENCY)
Facility: HOSPITAL | Age: 38
Discharge: HOME | End: 2023-10-03
Attending: EMERGENCY MEDICINE
Payer: MEDICAID

## 2023-10-03 VITALS
HEART RATE: 78 BPM | DIASTOLIC BLOOD PRESSURE: 80 MMHG | OXYGEN SATURATION: 100 % | HEIGHT: 63 IN | BODY MASS INDEX: 31.64 KG/M2 | RESPIRATION RATE: 20 BRPM | WEIGHT: 178.56 LBS | TEMPERATURE: 98 F | SYSTOLIC BLOOD PRESSURE: 157 MMHG

## 2023-10-03 DIAGNOSIS — R11.2 NAUSEA & VOMITING: ICD-10-CM

## 2023-10-03 DIAGNOSIS — K59.03 DRUG-INDUCED CONSTIPATION: ICD-10-CM

## 2023-10-03 DIAGNOSIS — Z87.42 HISTORY OF ENDOMETRIOSIS: Primary | ICD-10-CM

## 2023-10-03 LAB
ALBUMIN SERPL-MCNC: 4.1 G/DL (ref 3.5–5)
ALBUMIN/GLOB SERPL: 1.1 RATIO (ref 1.1–2)
ALP SERPL-CCNC: 100 UNIT/L (ref 40–150)
ALT SERPL-CCNC: 16 UNIT/L (ref 0–55)
APPEARANCE UR: CLEAR
AST SERPL-CCNC: 15 UNIT/L (ref 5–34)
BACTERIA #/AREA URNS AUTO: ABNORMAL /HPF
BASOPHILS # BLD AUTO: 0.05 X10(3)/MCL
BASOPHILS NFR BLD AUTO: 0.8 %
BILIRUB SERPL-MCNC: 0.2 MG/DL
BILIRUB UR QL STRIP.AUTO: NEGATIVE
BUN SERPL-MCNC: 14.7 MG/DL (ref 7–18.7)
CALCIUM SERPL-MCNC: 9.9 MG/DL (ref 8.4–10.2)
CHLORIDE SERPL-SCNC: 109 MMOL/L (ref 98–107)
CO2 SERPL-SCNC: 26 MMOL/L (ref 22–29)
COLOR UR AUTO: ABNORMAL
CREAT SERPL-MCNC: 1.04 MG/DL (ref 0.55–1.02)
EOSINOPHIL # BLD AUTO: 0.11 X10(3)/MCL (ref 0–0.9)
EOSINOPHIL NFR BLD AUTO: 1.8 %
ERYTHROCYTE [DISTWIDTH] IN BLOOD BY AUTOMATED COUNT: 13.1 % (ref 11.5–17)
GFR SERPLBLD CREATININE-BSD FMLA CKD-EPI: >60 MLS/MIN/1.73/M2
GLOBULIN SER-MCNC: 3.6 GM/DL (ref 2.4–3.5)
GLUCOSE SERPL-MCNC: 108 MG/DL (ref 74–100)
GLUCOSE UR QL STRIP.AUTO: NORMAL
HCT VFR BLD AUTO: 41.2 % (ref 37–47)
HGB BLD-MCNC: 13 G/DL (ref 12–16)
HYALINE CASTS #/AREA URNS LPF: ABNORMAL /LPF
IMM GRANULOCYTES # BLD AUTO: 0.01 X10(3)/MCL (ref 0–0.04)
IMM GRANULOCYTES NFR BLD AUTO: 0.2 %
KETONES UR QL STRIP.AUTO: NEGATIVE
LEUKOCYTE ESTERASE UR QL STRIP.AUTO: NEGATIVE
LIPASE SERPL-CCNC: 20 U/L
LYMPHOCYTES # BLD AUTO: 1.69 X10(3)/MCL (ref 0.6–4.6)
LYMPHOCYTES NFR BLD AUTO: 28.4 %
MCH RBC QN AUTO: 28 PG (ref 27–31)
MCHC RBC AUTO-ENTMCNC: 31.6 G/DL (ref 33–36)
MCV RBC AUTO: 88.6 FL (ref 80–94)
MONOCYTES # BLD AUTO: 0.35 X10(3)/MCL (ref 0.1–1.3)
MONOCYTES NFR BLD AUTO: 5.9 %
MUCOUS THREADS URNS QL MICRO: ABNORMAL /LPF
NEUTROPHILS # BLD AUTO: 3.74 X10(3)/MCL (ref 2.1–9.2)
NEUTROPHILS NFR BLD AUTO: 62.9 %
NITRITE UR QL STRIP.AUTO: NEGATIVE
NRBC BLD AUTO-RTO: 0 %
PH UR STRIP.AUTO: 6.5 [PH]
PLATELET # BLD AUTO: 216 X10(3)/MCL (ref 130–400)
PMV BLD AUTO: 11.8 FL (ref 7.4–10.4)
POTASSIUM SERPL-SCNC: 4.1 MMOL/L (ref 3.5–5.1)
PROT SERPL-MCNC: 7.7 GM/DL (ref 6.4–8.3)
PROT UR QL STRIP.AUTO: NEGATIVE
RBC # BLD AUTO: 4.65 X10(6)/MCL (ref 4.2–5.4)
RBC #/AREA URNS AUTO: ABNORMAL /HPF
RBC UR QL AUTO: ABNORMAL
SODIUM SERPL-SCNC: 144 MMOL/L (ref 136–145)
SP GR UR STRIP.AUTO: 1.02 (ref 1–1.03)
SQUAMOUS #/AREA URNS LPF: ABNORMAL /HPF
TROPONIN I SERPL-MCNC: <0.01 NG/ML (ref 0–0.04)
UROBILINOGEN UR STRIP-ACNC: NORMAL
WBC # SPEC AUTO: 5.95 X10(3)/MCL (ref 4.5–11.5)
WBC #/AREA URNS AUTO: ABNORMAL /HPF

## 2023-10-03 PROCEDURE — 83690 ASSAY OF LIPASE: CPT | Performed by: PHYSICIAN ASSISTANT

## 2023-10-03 PROCEDURE — 99285 EMERGENCY DEPT VISIT HI MDM: CPT | Mod: 25

## 2023-10-03 PROCEDURE — 96374 THER/PROPH/DIAG INJ IV PUSH: CPT

## 2023-10-03 PROCEDURE — 96361 HYDRATE IV INFUSION ADD-ON: CPT

## 2023-10-03 PROCEDURE — 84484 ASSAY OF TROPONIN QUANT: CPT | Performed by: PHYSICIAN ASSISTANT

## 2023-10-03 PROCEDURE — 80053 COMPREHEN METABOLIC PANEL: CPT | Performed by: PHYSICIAN ASSISTANT

## 2023-10-03 PROCEDURE — 25000003 PHARM REV CODE 250: Performed by: PHYSICIAN ASSISTANT

## 2023-10-03 PROCEDURE — 93005 ELECTROCARDIOGRAM TRACING: CPT

## 2023-10-03 PROCEDURE — 63600175 PHARM REV CODE 636 W HCPCS: Performed by: PHYSICIAN ASSISTANT

## 2023-10-03 PROCEDURE — 81001 URINALYSIS AUTO W/SCOPE: CPT | Performed by: PHYSICIAN ASSISTANT

## 2023-10-03 PROCEDURE — 85025 COMPLETE CBC W/AUTO DIFF WBC: CPT | Performed by: PHYSICIAN ASSISTANT

## 2023-10-03 PROCEDURE — 25500020 PHARM REV CODE 255: Performed by: PHYSICIAN ASSISTANT

## 2023-10-03 PROCEDURE — 96375 TX/PRO/DX INJ NEW DRUG ADDON: CPT

## 2023-10-03 PROCEDURE — 96376 TX/PRO/DX INJ SAME DRUG ADON: CPT

## 2023-10-03 RX ORDER — POLYETHYLENE GLYCOL 3350, SODIUM SULFATE ANHYDROUS, SODIUM BICARBONATE, SODIUM CHLORIDE, POTASSIUM CHLORIDE 236; 22.74; 6.74; 5.86; 2.97 G/4L; G/4L; G/4L; G/4L; G/4L
240 POWDER, FOR SOLUTION ORAL ONCE
Qty: 240 ML | Refills: 0 | Status: SHIPPED | OUTPATIENT
Start: 2023-10-03 | End: 2023-10-03

## 2023-10-03 RX ORDER — ONDANSETRON 2 MG/ML
4 INJECTION INTRAMUSCULAR; INTRAVENOUS
Status: COMPLETED | OUTPATIENT
Start: 2023-10-03 | End: 2023-10-03

## 2023-10-03 RX ORDER — MORPHINE SULFATE 2 MG/ML
4 INJECTION, SOLUTION INTRAMUSCULAR; INTRAVENOUS
Status: COMPLETED | OUTPATIENT
Start: 2023-10-03 | End: 2023-10-03

## 2023-10-03 RX ADMIN — IOHEXOL 100 ML: 350 INJECTION, SOLUTION INTRAVENOUS at 04:10

## 2023-10-03 RX ADMIN — SODIUM CHLORIDE 1000 ML: 9 INJECTION, SOLUTION INTRAVENOUS at 02:10

## 2023-10-03 RX ADMIN — MORPHINE SULFATE 4 MG: 2 INJECTION, SOLUTION INTRAMUSCULAR; INTRAVENOUS at 02:10

## 2023-10-03 RX ADMIN — ONDANSETRON 4 MG: 2 INJECTION INTRAMUSCULAR; INTRAVENOUS at 02:10

## 2023-10-03 RX ADMIN — MORPHINE SULFATE 4 MG: 2 INJECTION, SOLUTION INTRAMUSCULAR; INTRAVENOUS at 05:10

## 2023-10-03 NOTE — DISCHARGE INSTRUCTIONS
Follow up with your GYN as soon as possible.   Follow up with your primary care provider within 2-3 days.

## 2023-10-03 NOTE — ED PROVIDER NOTES
Encounter Date: 10/3/2023       History     Chief Complaint   Patient presents with    Vomiting     C/o worsening vomiting and abd pain after ED visit yesterday.      Patient reports to the emergency room with complaints of vomiting and abdominal pain for the past week or so; patient reports the vomiting became worse this morning; patient has a history of endometriosis and reports her abdominal pain may be related to that    The history is provided by the patient.   Emesis   This is a new problem. The current episode started several days ago. The problem occurs 2 - 4 times per day. Associated symptoms include abdominal pain. Pertinent negatives include no chills, no cough, no fever and no myalgias.     Review of patient's allergies indicates:  No Known Allergies  Past Medical History:   Diagnosis Date    Endometriosis     Hypertension      Past Surgical History:   Procedure Laterality Date     SECTION      hernia repair      PARTIAL HYSTERECTOMY      REPAIR OF VAGINAL CUFF       Family History   Problem Relation Age of Onset    Cancer Father     Hypertension Father     Diabetes Father     Hypertension Mother     Diabetes Mother     Diabetes Sister     Hypertension Sister      Social History     Tobacco Use    Smoking status: Former     Types: Cigarettes    Smokeless tobacco: Current   Substance Use Topics    Alcohol use: Not Currently    Drug use: Not Currently     Review of Systems   Constitutional:  Negative for chills and fever.   HENT:  Negative for sore throat.    Eyes: Negative.    Respiratory:  Negative for cough and shortness of breath.    Cardiovascular:  Negative for chest pain.   Gastrointestinal:  Positive for abdominal pain, nausea and vomiting.   Genitourinary:  Negative for dysuria.   Musculoskeletal:  Negative for back pain and myalgias.   Skin:  Negative for rash.   Neurological:  Negative for weakness.   Hematological:  Does not bruise/bleed easily.   Psychiatric/Behavioral: Negative.          Physical Exam     Initial Vitals [10/03/23 1309]   BP Pulse Resp Temp SpO2   (!) 161/100 80 17 97.9 °F (36.6 °C) 100 %      MAP       --         Physical Exam    Vitals reviewed.  Constitutional: She appears well-developed and well-nourished.   HENT:   Head: Normocephalic and atraumatic.   Eyes: Conjunctivae and EOM are normal. Pupils are equal, round, and reactive to light.   Neck: Neck supple.   Normal range of motion.  Cardiovascular:  Normal rate, regular rhythm, normal heart sounds and intact distal pulses.           Pulmonary/Chest: Breath sounds normal. No respiratory distress. She has no wheezes. She exhibits no tenderness.   Abdominal: Abdomen is soft. Bowel sounds are normal. She exhibits no distension. There is abdominal tenderness in the suprapubic area.     There is no rebound and no guarding.   Musculoskeletal:         General: Normal range of motion.      Cervical back: Normal range of motion and neck supple.     Neurological: She is alert and oriented to person, place, and time. She displays normal reflexes. No cranial nerve deficit or sensory deficit.   Skin: Skin is warm and dry.   Psychiatric: She has a normal mood and affect. Her behavior is normal. Judgment and thought content normal.         ED Course   Procedures  Labs Reviewed   COMPREHENSIVE METABOLIC PANEL - Abnormal; Notable for the following components:       Result Value    Chloride 109 (*)     Glucose Level 108 (*)     Creatinine 1.04 (*)     Globulin 3.6 (*)     All other components within normal limits   URINALYSIS, REFLEX TO URINE CULTURE - Abnormal; Notable for the following components:    Blood, UA Trace (*)     Squamous Epithelial Cells, UA Few (*)     Mucous, UA Trace (*)     All other components within normal limits   CBC WITH DIFFERENTIAL - Abnormal; Notable for the following components:    MCHC 31.6 (*)     MPV 11.8 (*)     All other components within normal limits   LIPASE - Normal   TROPONIN I - Normal   CBC W/ AUTO  DIFFERENTIAL    Narrative:     The following orders were created for panel order CBC auto differential.  Procedure                               Abnormality         Status                     ---------                               -----------         ------                     CBC with Differential[[id number]]       Abnormal            Final result                 Please view results for these tests on the individual orders.   EXTRA TUBES    Narrative:     The following orders were created for panel order EXTRA TUBES.  Procedure                               Abnormality         Status                     ---------                               -----------         ------                     Gold Top Hold[[id number]]                                   In process                   Please view results for these tests on the individual orders.   GOLD TOP HOLD        ECG Results              EKG 12-lead (In process)  Result time 10/03/23 16:23:16      In process by Interface, Lab In [facility] (10/03/23 16:23:16)                   Narrative:    Test Reason : R11.2,    Vent. Rate : 069 BPM     Atrial Rate : 069 BPM     P-R Int : 150 ms          QRS Dur : 084 ms      QT Int : 402 ms       P-R-T Axes : 058 -17 021 degrees     QTc Int : 430 ms    Sinus rhythm with marked sinus arrythmia  Minimal voltage criteria for LVH, may be normal variant  Possible Anterior infarct ,age undetermined  Abnormal ECG  No previous ECGs available    Referred By: [email or web address]   SELF           Confirmed By:                                   Imaging Results              CT Abdomen Pelvis With Contrast (Final result)  Result time 10/03/23 16:58:00      Final result by Emir Dalton MD (10/03/23 16:58:00)                   Impression:      No acute process identified.      Electronically signed by: Emir Dalton  Date:    10/03/2023  Time:    16:58               Narrative:    EXAMINATION:  CT ABDOMEN PELVIS WITH CONTRAST    CLINICAL  HISTORY:  Nausea/vomiting;    TECHNIQUE:  CT imaging of the abdomen and pelvis after intravenous contrast. Dose length product 302 mGycm. Automatic exposure control, adjustment of mA/kV or iterative reconstruction technique used to limit radiation dose.    COMPARISON:  CT 06/09/2023    FINDINGS:  Liver/biliary: Normal liver.  Prior cholecystectomy. No biliary dilatation.    Pancreas: Normal.    Spleen: Normal.    Adrenals: Normal.    Genitourinary: Symmetric renal enhancement. No hydronephrosis. Bladder decompressed and not well evaluated.  Uterus not seen.  No adnexal mass.    Stomach/bowel: No evidence of bowel obstruction. Normal appendix. No definitive sites of bowel inflammation.    Lymph nodes/peritoneum: No pathologically enlarged lymph node identified. No ascites or free air. No fluid collection.    Vasculature: Normal abdominal aortic caliber.    Abdominal wall: Normal.    Lung bases: No consolidation or pleural effusion.    Musculoskeletal: No acute osseous findings.                                       Medications   iohexoL (OMNIPAQUE 350) 350 mg iodine/mL injection (has no administration in time range)   morphine injection 4 mg (has no administration in time range)   sodium chloride 0.9% bolus 1,000 mL 1,000 mL (0 mLs Intravenous Stopped 10/3/23 1559)   ondansetron injection 4 mg (4 mg Intravenous Given 10/3/23 1459)   morphine injection 4 mg (4 mg Intravenous Given 10/3/23 1459)   iohexoL (OMNIPAQUE 350) injection 100 mL (100 mLs Intravenous Given 10/3/23 1642)     Medical Decision Making  38 y/o female with PMHx of endometriosis followed by GYN here at [facility] comes in with continued lower abdominal pain and nausea/vomiting. This is her 4th Er visit for the same complaint.     Hospital Course: CBC wnl. CMP with Creatinine slightly elevated from baseline (1.04). Patient given IV fluids to correct. She was diagnosed with constipation yesterday and states that she has tried Miralax at home and has not had a  bowel movment. Requesting something stronger. I have counseled her that taking Percocet daily for her endometriosis pain has caused her to be constipated. Reports she has Percocet at home for pain from endometriosis. She states she missed her last GYN appt due to not having a ride. She has an appt with them in January 2024. CT abdomen/pelvis without any acute abnormality. Discussed results with her. She states the Morphine helped her drastically. Instructed her to follow up with PCP and keep her scheduled appt with GYN and pain management in January. She verbalized understanding. Stable for discharge.     Amount and/or Complexity of Data Reviewed  Labs: ordered.  Radiology: ordered.    Risk  Prescription drug management.               ED Course as of 10/03/23 1729   Tue Oct 03, 2023   1607 Transition to FABI Dunlap [AL]   1610 I, Berkley Hlil PA-C, assumed care of patient.  [VH]      ED Course User Index  [AL] Renny Arrieta PA  [VH] Berkley Hill PA-C                    Clinical Impression:   Final diagnoses:  [R11.2] Nausea & vomiting  [Z87.42] History of endometriosis (Primary)  [K59.03] Drug-induced constipation        ED Disposition Condition    Discharge Stable          ED Prescriptions       Medication Sig Dispense Start Date End Date Auth. Provider    polyethylene glycol (GOLYTELY) 236-22.74-6.74 -5.86 gram suspension (Expires today) Take 240 mLs by mouth once. for 1 dose 240 mL 10/3/2023 10/3/2023 Berkley Hill PA-C          Follow-up Information       Follow up With Specialties Details Why Contact Info    Daniela Lewis, DEDRA Emergency Medicine   [address] [facility] [address]  [phone]      [facility] Emergency Dept Emergency Medicine In 3 days As needed, If symptoms worsen [address] [facility] [address] [phone]             Berkley Hill PA-C  10/03/23 1729       Berkley Hill PA-C  10/03/23 2497

## 2023-10-03 NOTE — ED PROVIDER NOTES
Encounter Date: 10/2/2023       History     Chief Complaint   Patient presents with    Abdominal Pain     Lower pelvic pain x 1 week, since last visit, with nausea and vomiting. Dx with BV; still taking abx.     37-year-old female presents to the emergency department with complaints of diffuse lower pelvic pain with nausea and vomiting x1 week.  She states this is an intermittent, chronic issue.  She states she is had multiple surgeries due to endometriosis and currently being treated for BV.  She states she is had a recent pelvic ultrasound and abdominal/pelvis CT was unremarkable.  She states she recently missed an OBGYN appointment and was rescheduled for January.  Patient rates her pain 8/10.  She denies dysuria, vaginal discharge, hematuria, fever, chills, chest pain, dizziness.    The history is provided by the patient. No  was used.     Review of patient's allergies indicates:  No Known Allergies  Past Medical History:   Diagnosis Date    Endometriosis     Hypertension      Past Surgical History:   Procedure Laterality Date     SECTION      hernia repair      PARTIAL HYSTERECTOMY      REPAIR OF VAGINAL CUFF       Family History   Problem Relation Age of Onset    Cancer Father     Hypertension Father     Diabetes Father     Hypertension Mother     Diabetes Mother     Diabetes Sister     Hypertension Sister      Social History     Tobacco Use    Smoking status: Former     Types: Cigarettes    Smokeless tobacco: Current   Substance Use Topics    Alcohol use: Not Currently    Drug use: Not Currently     Review of Systems   Constitutional:  Negative for chills and fever.   HENT: Negative.     Respiratory:  Negative for cough, chest tightness and shortness of breath.    Cardiovascular:  Negative for chest pain and palpitations.   Gastrointestinal:  Positive for abdominal pain (diffuse), nausea and vomiting. Negative for diarrhea.   Genitourinary:  Negative for decreased urine volume and  dysuria.   Musculoskeletal:  Negative for back pain.   Skin:  Negative for rash and wound.   Neurological:  Negative for dizziness and numbness.       Physical Exam     Initial Vitals [10/02/23 1747]   BP Pulse Resp Temp SpO2   (!) 161/109 109 16 98.9 °F (37.2 °C) 100 %      MAP       --         Physical Exam    Nursing note and vitals reviewed.  Constitutional: She appears well-developed and well-nourished.   HENT:   Nose: Nose normal.   Mouth/Throat: Oropharynx is clear and moist.   Eyes: Conjunctivae are normal.   Neck: Neck supple.   Normal range of motion.  Cardiovascular:  Normal rate, regular rhythm, normal heart sounds and intact distal pulses.           Pulmonary/Chest: Breath sounds normal.   Abdominal: Abdomen is soft. Bowel sounds are normal. There is abdominal tenderness (bi lower). There is no rebound and no guarding.   Musculoskeletal:         General: Normal range of motion.      Cervical back: Normal range of motion and neck supple.     Neurological: She is alert.   Skin: Skin is warm. Capillary refill takes less than 2 seconds.         ED Course   Procedures  Labs Reviewed   URINALYSIS, REFLEX TO URINE CULTURE - Abnormal; Notable for the following components:       Result Value    Blood, UA Trace (*)     Squamous Epithelial Cells, UA Trace (*)     Mucous, UA Trace (*)     All other components within normal limits   CBC WITH DIFFERENTIAL - Abnormal; Notable for the following components:    MCHC 32.3 (*)     MPV 11.3 (*)     All other components within normal limits   LIPASE - Normal   COVID/FLU A&B PCR - Normal    Narrative:     The Xpert Xpress SARS-CoV-2/FLU/RSV plus is a rapid, multiplexed real-time PCR test intended for the simultaneous qualitative detection and differentiation of SARS-CoV-2, Influenza A, Influenza B, and respiratory syncytial virus (RSV) viral RNA in either nasopharyngeal swab or nasal swab specimens.         CHLAMYDIA/GONORRHOEAE(GC), PCR    Narrative:     The Xpert CT/NG  test, performed on the [employer] system is a qualitative in vitro real-time polymerase chain reaction (PCR) test for the automated detected and differentiation for genomic DNA from Chlamydia trachomatis (CT) and/or Neisseria gonorrhoeae (NG).   CBC W/ AUTO DIFFERENTIAL    Narrative:     The following orders were created for panel order CBC Auto Differential.  Procedure                               Abnormality         Status                     ---------                               -----------         ------                     CBC with Differential[[id number]]       Abnormal            Final result                 Please view results for these tests on the individual orders.   EXTRA TUBES    Narrative:     The following orders were created for panel order EXTRA TUBES.  Procedure                               Abnormality         Status                     ---------                               -----------         ------                     Light Blue Top Hold[[id number]]                             In process                 Red Top Hold[[id number]]                                    In process                 Gold Top Hold[[id number]]                                   In process                   Please view results for these tests on the individual orders.   LIGHT BLUE TOP HOLD   RED TOP HOLD   GOLD TOP HOLD   POCT URINE PREGNANCY          Imaging Results              X-Ray Abdomen Flat And Erect (Final result)  Result time 10/02/23 19:22:40      Final result by Hernán Dos Santos MD (10/02/23 19:22:40)                   Impression:      Moderate stool in the colon.      Electronically signed by: Hernán Dos Santos  Date:    10/02/2023  Time:    19:22               Narrative:    EXAMINATION:  XR ABDOMEN FLAT AND ERECT    CLINICAL HISTORY:  Lower abdominal pain, unspecified    COMPARISON:  16 May 2020    FINDINGS:  Flat and upright views of the abdomen.  There is a nonspecific, nonobstructive bowel gas pattern.  There  is moderate stool in the colon.  No free air.  There are cholecystectomy clips.                                       Medications   morphine injection 2 mg (2 mg Intramuscular Given 10/2/23 1932)   ketorolac injection 30 mg (30 mg Intramuscular Given 10/2/23 1932)   magnesium hydroxide 400 mg/5 ml suspension 2,400 mg (2,400 mg Oral Given 10/2/23 2028)   lactulose 20 gram/30 mL solution Soln 20 g (20 g Oral Given 10/2/23 2028)     Medical Decision Making  37-year-old female presents to the emergency department with complaints of diffuse lower pelvic pain with nausea and vomiting x1 week.  She states this is an intermittent, chronic issue.  She states she is had multiple surgeries due to endometriosis and currently being treated for BV.  She states she is had a recent pelvic ultrasound and abdominal/pelvis CT was unremarkable.  She states she recently missed an OBGYN appointment and was rescheduled for January.  Patient rates her pain 8/10.  She denies dysuria, vaginal discharge, hematuria, fever, chills, chest pain, dizziness.    DDx: STI, UTI. Constipation, endometriosis, ovarian cyst    Blood work and urinalysis unremarkable.  Moderate stool in colon seen on x-ray.  Morphine 4 mg IM and Toradol 30 mg IM given for pain.  Magnesium hydroxide and lactulose given in the ED for constipation.  Patient needs to follow up with her gyn as soon as possible.    The patient is resting comfortably and in no acute distress.  She states that her symptoms have improved after treatment in Emergency Department. I personally discussed her test results and treatment plan.  Gave strict ED precautions, discussed specific conditions for return to the emergency department and importance of follow up with her primary care provider.  Patient voices understanding and agrees to the plan discussed. All patients' questions have been answered at this time.   She has remained hemodynamically stable throughout entire stay in ED and is stable for  discharge home.      Amount and/or Complexity of Data Reviewed  Labs: ordered.     Details: Unremarkable    Radiology: ordered and independent interpretation performed. Decision-making details documented in ED Course.     Details: I agree with Radiology read, moderate stool in colon    Risk  OTC drugs.  Prescription drug management.               ED Course as of 10/03/23 0247   Mon Oct 02, 2023   2008 X-Ray Abdomen Flat And Erect  Moderate stool in the colon. [ER]      ED Course User Index  [ER] Elly Garcia PA                    Clinical Impression:   Final diagnoses:  [R10.30] Lower abdominal pain        ED Disposition Condition    Discharge Stable          ED Prescriptions    None       Follow-up Information       Follow up With Specialties Details Why Contact Info    [facility] - Emergency Dept Emergency Medicine  As needed, If symptoms worsen [address] [facility] [address] [phone]             Elly Garcia PA  10/03/23 0247

## 2023-10-06 ENCOUNTER — HOSPITAL ENCOUNTER (EMERGENCY)
Facility: HOSPITAL | Age: 38
Discharge: LEFT BEFORE DISCHARGE | End: 2023-10-07
Attending: STUDENT IN AN ORGANIZED HEALTH CARE EDUCATION/TRAINING PROGRAM
Payer: MEDICAID

## 2023-10-06 VITALS
TEMPERATURE: 99 F | RESPIRATION RATE: 16 BRPM | HEART RATE: 107 BPM | BODY MASS INDEX: 31.89 KG/M2 | DIASTOLIC BLOOD PRESSURE: 109 MMHG | SYSTOLIC BLOOD PRESSURE: 156 MMHG | WEIGHT: 180 LBS | OXYGEN SATURATION: 98 %

## 2023-10-06 DIAGNOSIS — R68.84 JAW PAIN: Primary | ICD-10-CM

## 2023-10-06 DIAGNOSIS — R00.0 TACHYCARDIA: ICD-10-CM

## 2023-10-06 LAB — B-HCG SERPL QL: NEGATIVE

## 2023-10-06 PROCEDURE — 93010 EKG 12-LEAD: ICD-10-PCS | Mod: ,,, | Performed by: INTERNAL MEDICINE

## 2023-10-06 PROCEDURE — 93005 ELECTROCARDIOGRAM TRACING: CPT

## 2023-10-06 PROCEDURE — 93010 ELECTROCARDIOGRAM REPORT: CPT | Mod: ,,, | Performed by: INTERNAL MEDICINE

## 2023-10-06 PROCEDURE — 99283 EMERGENCY DEPT VISIT LOW MDM: CPT

## 2023-10-06 PROCEDURE — 25000003 PHARM REV CODE 250: Performed by: STUDENT IN AN ORGANIZED HEALTH CARE EDUCATION/TRAINING PROGRAM

## 2023-10-06 PROCEDURE — 81025 URINE PREGNANCY TEST: CPT | Performed by: STUDENT IN AN ORGANIZED HEALTH CARE EDUCATION/TRAINING PROGRAM

## 2023-10-06 RX ORDER — HYDROCODONE BITARTRATE AND ACETAMINOPHEN 5; 325 MG/1; MG/1
1 TABLET ORAL
Status: COMPLETED | OUTPATIENT
Start: 2023-10-06 | End: 2023-10-06

## 2023-10-06 RX ADMIN — HYDROCODONE BITARTRATE AND ACETAMINOPHEN 1 TABLET: 5; 325 TABLET ORAL at 11:10

## 2023-10-06 NOTE — Clinical Note
Date: 10/7/2023  Patient: Waldo Yu  Admitted: 10/6/2023 10:01 PM  Attending Provider: Kain Massey MD    Waldo Yu or her authorized caregiver has made the decision for the patient to leave the emergency department against the advice of  the emergency department staff. She or her authorized caregiver has been informed and understands the inherent risks, including death.  She or her authorized caregiver has decided to accept the responsibility for this decision. Waldo Yu and all  necessary parties have been advised that she may return for further evaluation or treatment. Her condition at time of discharge was 1:29 AM .  Waldo Yu had current vital signs as follows:  BP (!) 156/109   Pulse 107   Temp 99.3 °F (37.4 °C)  (Oral)   Resp 16   Wt 81.6 kg (180 lb)
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
## 2023-10-07 NOTE — ED NOTES
Patient not present inside of room; [place] in registration reports that patient left. Last time patient was seen inside of room was at 0043

## 2023-10-09 ENCOUNTER — HOSPITAL ENCOUNTER (EMERGENCY)
Facility: HOSPITAL | Age: 38
Discharge: LEFT BEFORE DISCHARGE | End: 2023-10-09
Attending: FAMILY MEDICINE
Payer: MEDICAID

## 2023-10-09 ENCOUNTER — HOSPITAL ENCOUNTER (EMERGENCY)
Facility: HOSPITAL | Age: 38
Discharge: HOME | End: 2023-10-09
Attending: EMERGENCY MEDICINE
Payer: MEDICAID

## 2023-10-09 VITALS
HEART RATE: 68 BPM | RESPIRATION RATE: 18 BRPM | TEMPERATURE: 99 F | BODY MASS INDEX: 31.89 KG/M2 | HEIGHT: 63 IN | SYSTOLIC BLOOD PRESSURE: 143 MMHG | OXYGEN SATURATION: 99 % | DIASTOLIC BLOOD PRESSURE: 87 MMHG | WEIGHT: 180 LBS

## 2023-10-09 VITALS
HEART RATE: 87 BPM | DIASTOLIC BLOOD PRESSURE: 104 MMHG | TEMPERATURE: 98 F | BODY MASS INDEX: 31.89 KG/M2 | SYSTOLIC BLOOD PRESSURE: 140 MMHG | WEIGHT: 180 LBS | HEIGHT: 63 IN | OXYGEN SATURATION: 99 % | RESPIRATION RATE: 18 BRPM

## 2023-10-09 DIAGNOSIS — R10.2 CHRONIC PELVIC PAIN IN FEMALE: Primary | ICD-10-CM

## 2023-10-09 DIAGNOSIS — R10.84 GENERALIZED ABDOMINAL PAIN: Primary | ICD-10-CM

## 2023-10-09 DIAGNOSIS — R10.9 ABDOMINAL PAIN: ICD-10-CM

## 2023-10-09 DIAGNOSIS — G89.29 CHRONIC PELVIC PAIN IN FEMALE: Primary | ICD-10-CM

## 2023-10-09 DIAGNOSIS — R11.2 NAUSEA AND VOMITING, UNSPECIFIED VOMITING TYPE: ICD-10-CM

## 2023-10-09 DIAGNOSIS — R52 PAIN: ICD-10-CM

## 2023-10-09 LAB
ALBUMIN SERPL-MCNC: 4 G/DL (ref 3.5–5)
ALBUMIN SERPL-MCNC: 4.1 G/DL (ref 3.5–5)
ALBUMIN/GLOB SERPL: 1.1 RATIO (ref 1.1–2)
ALBUMIN/GLOB SERPL: 1.1 RATIO (ref 1.1–2)
ALP SERPL-CCNC: 92 UNIT/L (ref 40–150)
ALP SERPL-CCNC: 94 UNIT/L (ref 40–150)
ALT SERPL-CCNC: 19 UNIT/L (ref 0–55)
ALT SERPL-CCNC: 19 UNIT/L (ref 0–55)
AMPHET UR QL SCN: NEGATIVE
APPEARANCE UR: CLEAR
APPEARANCE UR: CLEAR
AST SERPL-CCNC: 17 UNIT/L (ref 5–34)
AST SERPL-CCNC: 18 UNIT/L (ref 5–34)
BACTERIA #/AREA URNS AUTO: ABNORMAL /HPF
BACTERIA #/AREA URNS AUTO: NORMAL /HPF
BARBITURATE SCN PRESENT UR: NEGATIVE
BASOPHILS # BLD AUTO: 0.05 X10(3)/MCL
BASOPHILS NFR BLD AUTO: 0.8 %
BENZODIAZ UR QL SCN: NEGATIVE
BILIRUB SERPL-MCNC: 0.3 MG/DL
BILIRUB SERPL-MCNC: 0.4 MG/DL
BILIRUB UR QL STRIP.AUTO: NEGATIVE
BILIRUB UR QL STRIP.AUTO: NEGATIVE
BUN SERPL-MCNC: 8.8 MG/DL (ref 7–18.7)
BUN SERPL-MCNC: 9.6 MG/DL (ref 7–18.7)
CALCIUM SERPL-MCNC: 10 MG/DL (ref 8.4–10.2)
CALCIUM SERPL-MCNC: 9.9 MG/DL (ref 8.4–10.2)
CANNABINOIDS UR QL SCN: NEGATIVE
CHLORIDE SERPL-SCNC: 110 MMOL/L (ref 98–107)
CHLORIDE SERPL-SCNC: 111 MMOL/L (ref 98–107)
CO2 SERPL-SCNC: 22 MMOL/L (ref 22–29)
CO2 SERPL-SCNC: 24 MMOL/L (ref 22–29)
COCAINE UR QL SCN: NEGATIVE
COLOR UR AUTO: ABNORMAL
COLOR UR AUTO: YELLOW
CREAT SERPL-MCNC: 0.79 MG/DL (ref 0.55–1.02)
CREAT SERPL-MCNC: 0.81 MG/DL (ref 0.55–1.02)
CRP SERPL HS-MCNC: 3.16 MG/L
EOSINOPHIL # BLD AUTO: 0.13 X10(3)/MCL (ref 0–0.9)
EOSINOPHIL NFR BLD AUTO: 2.1 %
ERYTHROCYTE [DISTWIDTH] IN BLOOD BY AUTOMATED COUNT: 13.2 % (ref 11.5–17)
GFR SERPLBLD CREATININE-BSD FMLA CKD-EPI: >60 MLS/MIN/1.73/M2
GFR SERPLBLD CREATININE-BSD FMLA CKD-EPI: >60 MLS/MIN/1.73/M2
GLOBULIN SER-MCNC: 3.7 GM/DL (ref 2.4–3.5)
GLOBULIN SER-MCNC: 3.9 GM/DL (ref 2.4–3.5)
GLUCOSE SERPL-MCNC: 87 MG/DL (ref 74–100)
GLUCOSE SERPL-MCNC: 95 MG/DL (ref 74–100)
GLUCOSE UR QL STRIP.AUTO: NEGATIVE
GLUCOSE UR QL STRIP.AUTO: NORMAL
HCT VFR BLD AUTO: 42.9 % (ref 37–47)
HGB BLD-MCNC: 13.7 G/DL (ref 12–16)
HYALINE CASTS #/AREA URNS LPF: ABNORMAL /LPF
IMM GRANULOCYTES # BLD AUTO: 0.01 X10(3)/MCL (ref 0–0.04)
IMM GRANULOCYTES NFR BLD AUTO: 0.2 %
KETONES UR QL STRIP.AUTO: NEGATIVE
KETONES UR QL STRIP.AUTO: NEGATIVE
LEUKOCYTE ESTERASE UR QL STRIP.AUTO: 25
LEUKOCYTE ESTERASE UR QL STRIP.AUTO: NEGATIVE
LIPASE SERPL-CCNC: 12 U/L
LYMPHOCYTES # BLD AUTO: 1.95 X10(3)/MCL (ref 0.6–4.6)
LYMPHOCYTES NFR BLD AUTO: 31.3 %
MCH RBC QN AUTO: 28.7 PG (ref 27–31)
MCHC RBC AUTO-ENTMCNC: 31.9 G/DL (ref 33–36)
MCV RBC AUTO: 89.7 FL (ref 80–94)
MONOCYTES # BLD AUTO: 0.45 X10(3)/MCL (ref 0.1–1.3)
MONOCYTES NFR BLD AUTO: 7.2 %
MUCOUS THREADS URNS QL MICRO: ABNORMAL /LPF
NEUTROPHILS # BLD AUTO: 3.65 X10(3)/MCL (ref 2.1–9.2)
NEUTROPHILS NFR BLD AUTO: 58.4 %
NITRITE UR QL STRIP.AUTO: NEGATIVE
NITRITE UR QL STRIP.AUTO: NEGATIVE
NRBC BLD AUTO-RTO: 0 %
OPIATES UR QL SCN: NEGATIVE
PCP UR QL: NEGATIVE
PH UR STRIP.AUTO: 5.5 [PH]
PH UR STRIP.AUTO: 5.5 [PH]
PH UR: 5.5 [PH] (ref 3–11)
PLATELET # BLD AUTO: 210 X10(3)/MCL (ref 130–400)
PMV BLD AUTO: 11.1 FL (ref 7.4–10.4)
POTASSIUM SERPL-SCNC: 3.6 MMOL/L (ref 3.5–5.1)
POTASSIUM SERPL-SCNC: 3.8 MMOL/L (ref 3.5–5.1)
PROT SERPL-MCNC: 7.7 GM/DL (ref 6.4–8.3)
PROT SERPL-MCNC: 8 GM/DL (ref 6.4–8.3)
PROT UR QL STRIP.AUTO: NEGATIVE
PROT UR QL STRIP.AUTO: NEGATIVE
RBC # BLD AUTO: 4.78 X10(6)/MCL (ref 4.2–5.4)
RBC #/AREA URNS AUTO: ABNORMAL /HPF
RBC #/AREA URNS AUTO: NORMAL /HPF
RBC UR QL AUTO: ABNORMAL
RBC UR QL AUTO: ABNORMAL
SODIUM SERPL-SCNC: 143 MMOL/L (ref 136–145)
SODIUM SERPL-SCNC: 143 MMOL/L (ref 136–145)
SP GR UR STRIP.AUTO: 1.02 (ref 1–1.03)
SP GR UR STRIP.AUTO: 1.02 (ref 1–1.03)
SQUAMOUS #/AREA URNS AUTO: NORMAL /HPF
SQUAMOUS #/AREA URNS LPF: ABNORMAL /HPF
UROBILINOGEN UR STRIP-ACNC: 0.2
UROBILINOGEN UR STRIP-ACNC: NORMAL
WBC # SPEC AUTO: 6.24 X10(3)/MCL (ref 4.5–11.5)
WBC #/AREA URNS AUTO: ABNORMAL /HPF
WBC #/AREA URNS AUTO: NORMAL /HPF

## 2023-10-09 PROCEDURE — 80053 COMPREHEN METABOLIC PANEL: CPT | Performed by: PHYSICIAN ASSISTANT

## 2023-10-09 PROCEDURE — 85025 COMPLETE CBC W/AUTO DIFF WBC: CPT | Performed by: PHYSICIAN ASSISTANT

## 2023-10-09 PROCEDURE — 99284 EMERGENCY DEPT VISIT MOD MDM: CPT | Mod: 27

## 2023-10-09 PROCEDURE — 83690 ASSAY OF LIPASE: CPT | Performed by: PHYSICIAN ASSISTANT

## 2023-10-09 PROCEDURE — 81001 URINALYSIS AUTO W/SCOPE: CPT | Performed by: PHYSICIAN ASSISTANT

## 2023-10-09 PROCEDURE — 99283 EMERGENCY DEPT VISIT LOW MDM: CPT

## 2023-10-09 PROCEDURE — 80307 DRUG TEST PRSMV CHEM ANLYZR: CPT | Performed by: FAMILY MEDICINE

## 2023-10-09 PROCEDURE — 86141 C-REACTIVE PROTEIN HS: CPT | Performed by: FAMILY MEDICINE

## 2023-10-09 PROCEDURE — 25000003 PHARM REV CODE 250: Performed by: PHYSICIAN ASSISTANT

## 2023-10-09 PROCEDURE — 81001 URINALYSIS AUTO W/SCOPE: CPT | Mod: 59 | Performed by: FAMILY MEDICINE

## 2023-10-09 PROCEDURE — 80053 COMPREHEN METABOLIC PANEL: CPT | Performed by: FAMILY MEDICINE

## 2023-10-09 RX ORDER — ONDANSETRON 4 MG/1
4 TABLET, ORALLY DISINTEGRATING ORAL
Status: COMPLETED | OUTPATIENT
Start: 2023-10-09 | End: 2023-10-09

## 2023-10-09 RX ORDER — HYDROMORPHONE HYDROCHLORIDE 2 MG/1
2 TABLET ORAL
Status: COMPLETED | OUTPATIENT
Start: 2023-10-09 | End: 2023-10-09

## 2023-10-09 RX ADMIN — ONDANSETRON 4 MG: 4 TABLET, ORALLY DISINTEGRATING ORAL at 04:10

## 2023-10-09 RX ADMIN — HYDROMORPHONE HYDROCHLORIDE 2 MG: 2 TABLET ORAL at 04:10

## 2023-10-09 NOTE — ED PROVIDER NOTES
Encounter Date: 10/9/2023       History     Chief Complaint   Patient presents with    Abdominal Pain     Complains of lower abdominal pain and vomiting. Reports she was diagnosed with colitis 4-5 days ago, started on antibiotics and pain medicines. Reports the pain medication is not helping. Reports she took  a Percocet 4 hours ago and Ketoralac 2 hours ago.     37-year-old presents back to the emergency room similar complaints to her last 5 visits to the emergency room abdominal pain nausea and vomiting she says no one helps him no one worked her up I reviewed all of her past records she recently had a CT scan and full workup by several physicians patient's physical exam was unremarkable she had no rebound no guarding I told her that we repeat some blood work give a low nausea medicines we will start with some Tylenol and Motrin for her pain patient got upset started cursing said the only thing that helps her is morphine I explained however not give her any narcotics for this chronic pain especially abdominal pain until we can figure out the cause make sure there is nothing emergent going on that we can referral to maybe a GI doctor for her chronic issues or gyn patient decided she did not want anything done he will leave AMA        Review of patient's allergies indicates:  No Known Allergies  Past Medical History:   Diagnosis Date    Endometriosis     Hypertension      Past Surgical History:   Procedure Laterality Date     SECTION      hernia repair      PARTIAL HYSTERECTOMY      REPAIR OF VAGINAL CUFF       Family History   Problem Relation Age of Onset    Cancer Father     Hypertension Father     Diabetes Father     Hypertension Mother     Diabetes Mother     Diabetes Sister     Hypertension Sister      Social History     Tobacco Use    Smoking status: Former     Types: Cigarettes    Smokeless tobacco: Current   Substance Use Topics    Alcohol use: Not Currently    Drug use: Not Currently     Review of  Systems   Constitutional:  Negative for fever.   HENT:  Negative for sore throat.    Respiratory:  Negative for shortness of breath.    Cardiovascular:  Negative for chest pain.   Gastrointestinal:  Positive for abdominal pain, nausea and vomiting.   Genitourinary:  Negative for dysuria.   Musculoskeletal:  Negative for back pain.   Skin:  Negative for rash.   Neurological:  Negative for weakness.   Hematological:  Does not bruise/bleed easily.   All other systems reviewed and are negative.      Physical Exam     Initial Vitals [10/09/23 1027]   BP Pulse Resp Temp SpO2   (!) 140/104 87 18 98.4 °F (36.9 °C) 99 %      MAP       --         Physical Exam    Nursing note and vitals reviewed.  Constitutional: She appears well-developed and well-nourished. She is active.   HENT:   Head: Normocephalic and atraumatic.   Eyes: Conjunctivae, EOM and lids are normal.   Neck: Trachea normal and phonation normal. Neck supple. No thyroid mass present.   Normal range of motion.  Cardiovascular:  Normal rate, regular rhythm, normal heart sounds and normal pulses.           Abdominal: Abdomen is soft. Bowel sounds are normal.   Musculoskeletal:      Cervical back: Normal range of motion and neck supple.     Neurological: She is alert.   Skin: Skin is warm and intact.   Psychiatric: She has a normal mood and affect. Her speech is normal and behavior is normal. Judgment and thought content normal. Cognition and memory are normal.         ED Course   Procedures  Labs Reviewed   URINALYSIS, REFLEX TO URINE CULTURE - Abnormal; Notable for the following components:       Result Value    Blood, UA Small (*)     All other components within normal limits   URINALYSIS, MICROSCOPIC - Normal   COMPREHENSIVE METABOLIC PANEL   COVID/RSV/FLU A&B PCR   HIGH SENSITIVITY CRP   DRUG SCREEN, URINE (BEAKER)          Imaging Results    None          Medications   sodium chloride 0.9% bolus 1,000 mL 1,000 mL (has no administration in time range)      Medical Decision Making  37-year-old presents back to the emergency room similar complaints to her last 5 visits to the emergency room abdominal pain nausea and vomiting she says no one helps him no one worked her up I reviewed all of her past records she recently had a CT scan and full workup by several physicians patient's physical exam was unremarkable she had no rebound no guarding I told her that we repeat some blood work give a low nausea medicines we will start with some Tylenol and Motrin for her pain patient got upset started cursing said the only thing that helps her is morphine I explained however not give her any narcotics for this chronic pain especially abdominal pain until we can figure out the cause make sure there is nothing emergent going on that we can referral to maybe a GI doctor for her chronic issues or gyn patient decided she did not want anything done he will leave AMA          Amount and/or Complexity of Data Reviewed  External Data Reviewed: labs, radiology, ECG and notes.  Labs: ordered.  Radiology: ordered.    Risk  Risk Details: Differential diagnosis endometriosis: Spasms obstruction drug-seeking anxiety depression                               Clinical Impression:   Final diagnoses:  [R52] Pain  [R10.84] Generalized abdominal pain (Primary)        ED Disposition Condition    Nixon Vo MD  10/09/23 1232

## 2023-10-09 NOTE — DISCHARGE INSTRUCTIONS
Report to Emergency Department if symptoms return or worsen; [facility] Within 1 to 2 days, It is important that you follow up with your primary care provider or specialist if indicated for further evaluation, workup, and treatment as necessary. The exam and treatment you received in Emergency Department was for an urgent problem and NOT INTENDED AS COMPLETE CARE. It is important that you FOLLOW UP with a doctor for ongoing care. If your symptoms become WORSE or you DO NOT IMPROVE and you are unable to reach your health care provider, you should RETURN to the Emergency Department. The Emergency Department provider has provided a PRELIMINARY INTERPRETATION of all your studies. A final interpretation may be done after you are discharged. If a change in your diagnosis or treatment is needed WE WILL CONTACT YOU. It is critical that we have a CURRENT PHONE NUMBER FOR YOU.

## 2023-10-09 NOTE — ED PROVIDER NOTES
Encounter Date: 10/9/2023     History     Chief Complaint   Patient presents with    Abdominal Pain    Vomiting     Pt reports to the ed vomiting and abdominal pain since night. Also states history of endometriosis. Vss. nadn     Patient with pmhx of endometriosis s/p hysterectomy, chronic pelvic pain, unspecified colitis, and HTN presents today lower abdominal pain and nausea/vomiting. Patient has chronic pelvic pain, but she says the pain worsened last night and she started vomiting last night as well. She says she is not tolerating PO. She has toradol and percocet at home for pain which she has taken today, but says it does not help. Multiple ED visits recently. Negative CT scan 6 days ago. She was seen at another ED earlier today, but left AMA. Patient says she was upset because they were only going to draw labs and give her toradol. She says she is waiting to be seen by pain management, GI, and gynecology. She denies fever, chills, diarrhea, constipation, dysuria, hematuria, vaginal discharge, vaginal bleeding.     The history is provided by the patient. No  was used.     Review of patient's allergies indicates:  No Known Allergies  Past Medical History:   Diagnosis Date    Endometriosis     Hypertension      Past Surgical History:   Procedure Laterality Date     SECTION      hernia repair      PARTIAL HYSTERECTOMY      REPAIR OF VAGINAL CUFF       Family History   Problem Relation Age of Onset    Cancer Father     Hypertension Father     Diabetes Father     Hypertension Mother     Diabetes Mother     Diabetes Sister     Hypertension Sister      Social History     Tobacco Use    Smoking status: Former     Types: Cigarettes    Smokeless tobacco: Current   Substance Use Topics    Alcohol use: Not Currently    Drug use: Not Currently     Review of Systems   Constitutional:  Negative for chills and fever.   Respiratory:  Negative for cough, chest tightness and shortness of breath.     Cardiovascular:  Negative for chest pain, palpitations and leg swelling.   Gastrointestinal:  Positive for abdominal pain, nausea and vomiting. Negative for anal bleeding, constipation and diarrhea.   Genitourinary:  Positive for pelvic pain. Negative for dysuria, flank pain, hematuria, menstrual problem, vaginal bleeding, vaginal discharge and vaginal pain.   Musculoskeletal:  Negative for arthralgias and myalgias.   Skin:  Negative for rash.   Neurological:  Negative for syncope, light-headedness and headaches.   All other systems reviewed and are negative.      Physical Exam     Initial Vitals [10/09/23 1304]   BP Pulse Resp Temp SpO2   (!) 166/109 93 18 98.7 °F (37.1 °C) 98 %      MAP       --         Physical Exam    Nursing note and vitals reviewed.  Constitutional: She appears well-developed and well-nourished. She is not diaphoretic. No distress.   HENT:   Head: Normocephalic and atraumatic.   Mouth/Throat: Oropharynx is clear and moist. No oropharyngeal exudate.   Eyes: Conjunctivae and EOM are normal.   Neck: Neck supple.   Normal range of motion.  Cardiovascular:  Normal rate, regular rhythm, normal heart sounds and intact distal pulses.           Pulmonary/Chest: Breath sounds normal. No respiratory distress. She has no wheezes. She has no rhonchi. She has no rales.   Abdominal: Abdomen is soft. Bowel sounds are normal. She exhibits no distension and no mass. There is abdominal tenderness in the right lower quadrant, suprapubic area and left lower quadrant.   No right CVA tenderness.  No left CVA tenderness. There is no rebound and no guarding.   Musculoskeletal:         General: No edema.      Cervical back: Normal range of motion and neck supple.     Neurological: She is alert and oriented to person, place, and time. GCS score is 15. GCS eye subscore is 4. GCS verbal subscore is 5. GCS motor subscore is 6.   Skin: Skin is warm and dry. Capillary refill takes less than 2 seconds. No rash noted.    Psychiatric: She has a normal mood and affect.         ED Course   Procedures  Labs Reviewed   COMPREHENSIVE METABOLIC PANEL - Abnormal; Notable for the following components:       Result Value    Chloride 110 (*)     Globulin 3.7 (*)     All other components within normal limits   URINALYSIS, REFLEX TO URINE CULTURE - Abnormal; Notable for the following components:    Blood, UA 1+ (*)     Leukocyte Esterase, UA 25 (*)     Squamous Epithelial Cells, UA Few (*)     Mucous, UA Trace (*)     All other components within normal limits   CBC WITH DIFFERENTIAL - Abnormal; Notable for the following components:    MCHC 31.9 (*)     MPV 11.1 (*)     All other components within normal limits   LIPASE - Normal   CBC W/ AUTO DIFFERENTIAL    Narrative:     The following orders were created for panel order CBC auto differential.  Procedure                               Abnormality         Status                     ---------                               -----------         ------                     CBC with Differential[[id number]]       Abnormal            Final result                 Please view results for these tests on the individual orders.   EXTRA TUBES    Narrative:     The following orders were created for panel order EXTRA TUBES.  Procedure                               Abnormality         Status                     ---------                               -----------         ------                     Light Blue Top Hold[[id number]]                             In process                 Gold Top Hold[[id number]]                                   In process                   Please view results for these tests on the individual orders.   LIGHT BLUE TOP HOLD   GOLD TOP HOLD          Imaging Results              X-Ray Abdomen Flat And Erect (Final result)  Result time 10/09/23 16:41:31      Final result by Niurka Talley MD (10/09/23 16:41:31)                   Impression:      No acute abnormality  identified.      Electronically signed by: Niurka Talley  Date:    10/09/2023  Time:    16:41               Narrative:    EXAMINATION:  XR ABDOMEN FLAT AND ERECT    CLINICAL HISTORY:  Unspecified abdominal pain    COMPARISON:  X-rays dated 10/02/2023    FINDINGS:  There is no free intraperitoneal air.  The bowel gas pattern is nonobstructive.  The lung bases are clear.                                       Medications   ondansetron disintegrating tablet 4 mg (4 mg Oral Given 10/9/23 1605)   HYDROmorphone tablet 2 mg (2 mg Oral Given 10/9/23 1605)     Medical Decision Making  Patient with hx of endometriosis s/p hysterectomy and chronic pelvic pain presents today c/o worsening of pelvic pain and nausea/vomiting. Multiple ED visits recently. Prescribed percocet and toradol for pain.     Ddx: chronic pelvic pain, vaginal spasms, constipation, colitis, bowel obstruction, cystitis, amongst others     ED course: UA unremarkable with out evidence of UTI.  CBC unremarkable, including no leukocytosis to suggest major infectious process.  CMP unremarkable.  Lipase within normal limits, no evidence of acute pancreatitis.  X-ray unremarkable without evidence of bowel obstruction or abdominal free air.  Abdominal exam unremarkable without peritoneal signs.  Patient able to tolerate p.o. meds in ED without vomiting.  Patient is nontoxic appearing with unremarkable vitals, stable for discharge.  Referral placed to gynecology clinic for follow-up.  ED precautions given for new or worsening symptoms and patient verbalized understanding.    Amount and/or Complexity of Data Reviewed  External Data Reviewed: labs, radiology and notes.     Details: EXAMINATION:  CT ABDOMEN PELVIS WITH CONTRAST     CLINICAL HISTORY:  Nausea/vomiting;     TECHNIQUE:  CT imaging of the abdomen and pelvis after intravenous contrast. Dose length product 302 mGycm. Automatic exposure control, adjustment of mA/kV or iterative reconstruction technique used  to limit radiation dose.     COMPARISON:  CT 06/09/2023     FINDINGS:  Liver/biliary: Normal liver.  Prior cholecystectomy. No biliary dilatation.     Pancreas: Normal.     Spleen: Normal.     Adrenals: Normal.     Genitourinary: Symmetric renal enhancement. No hydronephrosis. Bladder decompressed and not well evaluated.  Uterus not seen.  No adnexal mass.     Stomach/bowel: No evidence of bowel obstruction. Normal appendix. No definitive sites of bowel inflammation.     Lymph nodes/peritoneum: No pathologically enlarged lymph node identified. No ascites or free air. No fluid collection.     Vasculature: Normal abdominal aortic caliber.     Abdominal wall: Normal.     Lung bases: No consolidation or pleural effusion.     Musculoskeletal: No acute osseous findings.     Impression:     No acute process identified.        Electronically signed by: Emir Dalton  Date:                                            10/03/2023  Time:                                           16:58  Labs: ordered. Decision-making details documented in ED Course.  Radiology: ordered and independent interpretation performed. Decision-making details documented in ED Course.    Risk  Prescription drug management.               ED Course as of 10/09/23 1712   Mon Oct 09, 2023   1556 Assumed patient care from Reena Dent PA-C [NB]   1557 Care transitioned to José Miguel Romero PA-C at 16:00 [SA]      ED Course User Index  [NB] José Miguel Romero PA  [SA] Reena Dent PA                  Clinical Impression:   Final diagnoses:  [R10.9] Abdominal pain  [R10.2, G89.29] Chronic pelvic pain in female (Primary)  [R11.2] Nausea and vomiting, unspecified vomiting type        ED Disposition Condition    Discharge Good          ED Prescriptions    None       Follow-up Information       Follow up With Specialties Details Why Contact Info    [facility] In 2 weeks  [address] [facility] [address] [phone]     [facility] Emergency Medicine  As needed, If symptoms worsen [address] [facility] [id number] [phone]             José Miguel Romero PA  10/09/23 8926

## 2023-10-09 NOTE — ED NOTES
"While looking to start her IV and hang fluids, Dr. GIOVANNI De La Torre came into the speak with patient and tell her the plan of care. Once Dr. De La Torre walked out of the room, patient became angry, cursing, saying " I don't want no dam Tylenol, Motrin, Ketoralac, or X-rays". Patient refused to sign AMA form and walked out of ER. Dr. De La Torre notified  "

## 2023-10-16 ENCOUNTER — PATIENT OUTREACH (OUTPATIENT)
Dept: EMERGENCY MEDICINE | Facility: HOSPITAL | Age: 38
End: 2023-10-16
Payer: MEDICAID
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
## 2023-11-13 ENCOUNTER — HOSPITAL ENCOUNTER (EMERGENCY)
Facility: HOSPITAL | Age: 38
Discharge: LEFT BEFORE DISCHARGE | End: 2023-11-14
Payer: MEDICAID

## 2023-11-13 VITALS
HEART RATE: 102 BPM | OXYGEN SATURATION: 99 % | HEIGHT: 63 IN | RESPIRATION RATE: 20 BRPM | BODY MASS INDEX: 31.89 KG/M2 | SYSTOLIC BLOOD PRESSURE: 168 MMHG | TEMPERATURE: 98 F | WEIGHT: 180 LBS | DIASTOLIC BLOOD PRESSURE: 75 MMHG

## 2023-11-13 PROCEDURE — 99283 EMERGENCY DEPT VISIT LOW MDM: CPT

## 2023-11-14 ENCOUNTER — HOSPITAL ENCOUNTER (EMERGENCY)
Facility: HOSPITAL | Age: 38
Discharge: HOME | End: 2023-11-14
Attending: SPECIALIST
Payer: MEDICAID

## 2023-11-14 VITALS
OXYGEN SATURATION: 98 % | WEIGHT: 180 LBS | RESPIRATION RATE: 20 BRPM | BODY MASS INDEX: 31.89 KG/M2 | TEMPERATURE: 98 F | HEART RATE: 95 BPM | DIASTOLIC BLOOD PRESSURE: 116 MMHG | SYSTOLIC BLOOD PRESSURE: 161 MMHG | HEIGHT: 63 IN

## 2023-11-14 DIAGNOSIS — M54.50 LOW BACK PAIN WITHOUT SCIATICA, UNSPECIFIED BACK PAIN LATERALITY, UNSPECIFIED CHRONICITY: ICD-10-CM

## 2023-11-14 DIAGNOSIS — R03.0 ELEVATED BLOOD PRESSURE READING: ICD-10-CM
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
## 2023-11-14 PROCEDURE — 99283 EMERGENCY DEPT VISIT LOW MDM: CPT

## 2023-11-14 RX ORDER — NABUMETONE 500 MG/1
500 TABLET, FILM COATED ORAL 2 TIMES DAILY PRN
Qty: 20 TABLET | Refills: 0 | OUTPATIENT
Start: 2023-11-14 | End: 2023-11-30
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
## 2023-11-16 ENCOUNTER — HOSPITAL ENCOUNTER (EMERGENCY)
Facility: HOSPITAL | Age: 38
Discharge: HOME | End: 2023-11-16
Attending: STUDENT IN AN ORGANIZED HEALTH CARE EDUCATION/TRAINING PROGRAM
Payer: MEDICAID

## 2023-11-16 VITALS
WEIGHT: 180 LBS | DIASTOLIC BLOOD PRESSURE: 84 MMHG | BODY MASS INDEX: 31.89 KG/M2 | HEART RATE: 109 BPM | SYSTOLIC BLOOD PRESSURE: 132 MMHG | TEMPERATURE: 98 F | OXYGEN SATURATION: 98 % | RESPIRATION RATE: 20 BRPM | HEIGHT: 63 IN

## 2023-11-16 DIAGNOSIS — S39.012A STRAIN OF LUMBAR REGION, INITIAL ENCOUNTER: Primary | ICD-10-CM

## 2023-11-16 PROCEDURE — 25000003 PHARM REV CODE 250: Performed by: STUDENT IN AN ORGANIZED HEALTH CARE EDUCATION/TRAINING PROGRAM

## 2023-11-16 PROCEDURE — 99283 EMERGENCY DEPT VISIT LOW MDM: CPT

## 2023-11-16 RX ORDER — HYDROCODONE BITARTRATE AND ACETAMINOPHEN 10; 325 MG/1; MG/1
1 TABLET ORAL
Status: COMPLETED | OUTPATIENT
Start: 2023-11-16 | End: 2023-11-16

## 2023-11-16 RX ADMIN — HYDROCODONE BITARTRATE AND ACETAMINOPHEN 1 TABLET: 10; 325 TABLET ORAL at 07:11

## 2023-11-17 ENCOUNTER — HOSPITAL ENCOUNTER (EMERGENCY)
Facility: HOSPITAL | Age: 38
Discharge: HOME | End: 2023-11-17
Attending: EMERGENCY MEDICINE
Payer: MEDICAID

## 2023-11-17 VITALS
RESPIRATION RATE: 18 BRPM | SYSTOLIC BLOOD PRESSURE: 150 MMHG | HEIGHT: 63 IN | DIASTOLIC BLOOD PRESSURE: 89 MMHG | WEIGHT: 180 LBS | BODY MASS INDEX: 31.89 KG/M2 | HEART RATE: 83 BPM | OXYGEN SATURATION: 100 % | TEMPERATURE: 98 F

## 2023-11-17 DIAGNOSIS — S39.012D BACK STRAIN, SUBSEQUENT ENCOUNTER: Primary | ICD-10-CM
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
## 2023-11-17 PROCEDURE — 99284 EMERGENCY DEPT VISIT MOD MDM: CPT

## 2023-11-17 PROCEDURE — 25000003 PHARM REV CODE 250: Performed by: NURSE PRACTITIONER

## 2023-11-17 PROCEDURE — 63600175 PHARM REV CODE 636 W HCPCS: Performed by: NURSE PRACTITIONER

## 2023-11-17 PROCEDURE — 96372 THER/PROPH/DIAG INJ SC/IM: CPT | Performed by: NURSE PRACTITIONER

## 2023-11-17 RX ORDER — KETOROLAC TROMETHAMINE 30 MG/ML
60 INJECTION, SOLUTION INTRAMUSCULAR; INTRAVENOUS
Status: COMPLETED | OUTPATIENT
Start: 2023-11-17 | End: 2023-11-17

## 2023-11-17 RX ORDER — ONDANSETRON 4 MG/1
4 TABLET, ORALLY DISINTEGRATING ORAL
Status: COMPLETED | OUTPATIENT
Start: 2023-11-17 | End: 2023-11-17

## 2023-11-17 RX ORDER — METHOCARBAMOL 500 MG/1
1000 TABLET, FILM COATED ORAL
Status: COMPLETED | OUTPATIENT
Start: 2023-11-17 | End: 2023-11-17

## 2023-11-17 RX ADMIN — KETOROLAC TROMETHAMINE 60 MG: 30 INJECTION, SOLUTION INTRAMUSCULAR at 11:11

## 2023-11-17 RX ADMIN — METHOCARBAMOL 1000 MG: 500 TABLET ORAL at 11:11

## 2023-11-17 RX ADMIN — ONDANSETRON 4 MG: 4 TABLET, ORALLY DISINTEGRATING ORAL at 11:11
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
## 2023-11-29 ENCOUNTER — HOSPITAL ENCOUNTER (EMERGENCY)
Facility: HOSPITAL | Age: 38
Discharge: HOME | End: 2023-11-29
Attending: SPECIALIST
Payer: MEDICAID

## 2023-11-29 VITALS
OXYGEN SATURATION: 97 % | TEMPERATURE: 99 F | SYSTOLIC BLOOD PRESSURE: 168 MMHG | WEIGHT: 180 LBS | RESPIRATION RATE: 18 BRPM | DIASTOLIC BLOOD PRESSURE: 120 MMHG | BODY MASS INDEX: 31.89 KG/M2 | HEART RATE: 96 BPM | HEIGHT: 63 IN

## 2023-11-29 DIAGNOSIS — S00.83XA CONTUSION OF FACE, INITIAL ENCOUNTER: Primary | ICD-10-CM

## 2023-11-29 DIAGNOSIS — S00.83XA CONTUSION OF FACE: ICD-10-CM

## 2023-11-29 DIAGNOSIS — R10.9 ABDOMINAL CRAMPING: ICD-10-CM

## 2023-11-29 PROCEDURE — 99284 EMERGENCY DEPT VISIT MOD MDM: CPT

## 2023-11-29 PROCEDURE — 25000003 PHARM REV CODE 250: Performed by: SPECIALIST

## 2023-11-29 RX ORDER — KETOROLAC TROMETHAMINE 10 MG/1
10 TABLET, FILM COATED ORAL
Status: COMPLETED | OUTPATIENT
Start: 2023-11-29 | End: 2023-11-29

## 2023-11-29 RX ADMIN — KETOROLAC TROMETHAMINE 10 MG: 10 TABLET, FILM COATED ORAL at 06:11

## 2023-11-30 ENCOUNTER — HOSPITAL ENCOUNTER (EMERGENCY)
Facility: HOSPITAL | Age: 38
Discharge: HOME | End: 2023-11-30
Attending: INTERNAL MEDICINE
Payer: MEDICAID

## 2023-11-30 VITALS
HEIGHT: 63 IN | HEART RATE: 105 BPM | OXYGEN SATURATION: 100 % | TEMPERATURE: 99 F | BODY MASS INDEX: 30.46 KG/M2 | RESPIRATION RATE: 16 BRPM | WEIGHT: 171.94 LBS | SYSTOLIC BLOOD PRESSURE: 144 MMHG | DIASTOLIC BLOOD PRESSURE: 91 MMHG

## 2023-11-30 DIAGNOSIS — T07.XXXA CONTUSION, MULTIPLE SITES: Primary | ICD-10-CM

## 2023-11-30 LAB
ALBUMIN SERPL-MCNC: 4.2 G/DL (ref 3.5–5)
ALBUMIN/GLOB SERPL: 1.1 RATIO (ref 1.1–2)
ALP SERPL-CCNC: 105 UNIT/L (ref 40–150)
ALT SERPL-CCNC: 23 UNIT/L (ref 0–55)
AST SERPL-CCNC: 13 UNIT/L (ref 5–34)
BASOPHILS # BLD AUTO: 0.05 X10(3)/MCL
BASOPHILS NFR BLD AUTO: 0.8 %
BILIRUB SERPL-MCNC: 0.2 MG/DL
BUN SERPL-MCNC: 14.5 MG/DL (ref 7–18.7)
CALCIUM SERPL-MCNC: 9.8 MG/DL (ref 8.4–10.2)
CHLORIDE SERPL-SCNC: 109 MMOL/L (ref 98–107)
CO2 SERPL-SCNC: 21 MMOL/L (ref 22–29)
CREAT SERPL-MCNC: 0.83 MG/DL (ref 0.55–1.02)
EOSINOPHIL # BLD AUTO: 0.13 X10(3)/MCL (ref 0–0.9)
EOSINOPHIL NFR BLD AUTO: 2.1 %
ERYTHROCYTE [DISTWIDTH] IN BLOOD BY AUTOMATED COUNT: 12.8 % (ref 11.5–17)
GFR SERPLBLD CREATININE-BSD FMLA CKD-EPI: >60 MLS/MIN/1.73/M2
GLOBULIN SER-MCNC: 3.9 GM/DL (ref 2.4–3.5)
GLUCOSE SERPL-MCNC: 97 MG/DL (ref 74–100)
HCT VFR BLD AUTO: 43.6 % (ref 37–47)
HGB BLD-MCNC: 14.2 G/DL (ref 12–16)
HOLD SPECIMEN: NORMAL
HOLD SPECIMEN: NORMAL
IMM GRANULOCYTES # BLD AUTO: 0.01 X10(3)/MCL (ref 0–0.04)
IMM GRANULOCYTES NFR BLD AUTO: 0.2 %
LYMPHOCYTES # BLD AUTO: 2.51 X10(3)/MCL (ref 0.6–4.6)
LYMPHOCYTES NFR BLD AUTO: 40.3 %
MCH RBC QN AUTO: 27.9 PG (ref 27–31)
MCHC RBC AUTO-ENTMCNC: 32.6 G/DL (ref 33–36)
MCV RBC AUTO: 85.7 FL (ref 80–94)
MONOCYTES # BLD AUTO: 0.6 X10(3)/MCL (ref 0.1–1.3)
MONOCYTES NFR BLD AUTO: 9.6 %
NEUTROPHILS # BLD AUTO: 2.93 X10(3)/MCL (ref 2.1–9.2)
NEUTROPHILS NFR BLD AUTO: 47 %
NRBC BLD AUTO-RTO: 0 %
PLATELET # BLD AUTO: 202 X10(3)/MCL (ref 130–400)
PMV BLD AUTO: 11.4 FL (ref 7.4–10.4)
POTASSIUM SERPL-SCNC: 3.2 MMOL/L (ref 3.5–5.1)
PROT SERPL-MCNC: 8.1 GM/DL (ref 6.4–8.3)
RBC # BLD AUTO: 5.09 X10(6)/MCL (ref 4.2–5.4)
SODIUM SERPL-SCNC: 143 MMOL/L (ref 136–145)
WBC # SPEC AUTO: 6.23 X10(3)/MCL (ref 4.5–11.5)

## 2023-11-30 PROCEDURE — 25000003 PHARM REV CODE 250

## 2023-11-30 PROCEDURE — 85025 COMPLETE CBC W/AUTO DIFF WBC: CPT

## 2023-11-30 PROCEDURE — 80053 COMPREHEN METABOLIC PANEL: CPT

## 2023-11-30 PROCEDURE — 99284 EMERGENCY DEPT VISIT MOD MDM: CPT

## 2023-11-30 RX ORDER — ONDANSETRON 4 MG/1
4 TABLET, ORALLY DISINTEGRATING ORAL ONCE
Status: COMPLETED | OUTPATIENT
Start: 2023-11-30 | End: 2023-11-30

## 2023-11-30 RX ORDER — HYDROCODONE BITARTRATE AND ACETAMINOPHEN 10; 325 MG/1; MG/1
1 TABLET ORAL
Status: COMPLETED | OUTPATIENT
Start: 2023-11-30 | End: 2023-11-30

## 2023-11-30 RX ORDER — DICLOFENAC SODIUM 75 MG/1
75 TABLET, DELAYED RELEASE ORAL 2 TIMES DAILY PRN
Qty: 30 TABLET | Refills: 0 | OUTPATIENT
Start: 2023-11-30 | End: 2023-12-10

## 2023-11-30 RX ORDER — DICLOFENAC SODIUM 75 MG/1
75 TABLET, DELAYED RELEASE ORAL 2 TIMES DAILY PRN
Qty: 30 TABLET | Refills: 0 | Status: SHIPPED | OUTPATIENT
Start: 2023-11-30 | End: 2023-11-30 | Stop reason: SDUPTHER

## 2023-11-30 RX ADMIN — HYDROCODONE BITARTRATE AND ACETAMINOPHEN 1 TABLET: 10; 325 TABLET ORAL at 09:11

## 2023-11-30 RX ADMIN — ONDANSETRON 4 MG: 4 TABLET, ORALLY DISINTEGRATING ORAL at 09:11

## 2023-11-30 NOTE — ED NOTES
Pt states she hasn't taken her night time BP medications yet; denies CP/ HA. Pt states she will take her BP meds when she gets home; MD notified and okay with discharge

## 2023-11-30 NOTE — DISCHARGE INSTRUCTIONS
You may take the nabumetone from the prescription given to you a few weeks ago as needed or ibuprofen as needed
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
## 2023-12-05 ENCOUNTER — HOSPITAL ENCOUNTER (EMERGENCY)
Facility: HOSPITAL | Age: 38
Discharge: HOME | End: 2023-12-05
Attending: INTERNAL MEDICINE
Payer: MEDICAID

## 2023-12-05 VITALS
RESPIRATION RATE: 19 BRPM | OXYGEN SATURATION: 100 % | BODY MASS INDEX: 30.16 KG/M2 | HEIGHT: 63 IN | HEART RATE: 70 BPM | SYSTOLIC BLOOD PRESSURE: 154 MMHG | WEIGHT: 170.19 LBS | TEMPERATURE: 98 F | DIASTOLIC BLOOD PRESSURE: 99 MMHG

## 2023-12-05 DIAGNOSIS — T07.XXXA CONTUSION, MULTIPLE SITES: Primary | ICD-10-CM

## 2023-12-05 LAB
ALBUMIN SERPL-MCNC: 4.1 G/DL (ref 3.5–5)
ALBUMIN/GLOB SERPL: 1.2 RATIO (ref 1.1–2)
ALP SERPL-CCNC: 87 UNIT/L (ref 40–150)
ALT SERPL-CCNC: 12 UNIT/L (ref 0–55)
AST SERPL-CCNC: 12 UNIT/L (ref 5–34)
BASOPHILS # BLD AUTO: 0.05 X10(3)/MCL
BASOPHILS NFR BLD AUTO: 0.8 %
BILIRUB SERPL-MCNC: 0.6 MG/DL
BUN SERPL-MCNC: 11.9 MG/DL (ref 7–18.7)
CALCIUM SERPL-MCNC: 9.6 MG/DL (ref 8.4–10.2)
CHLORIDE SERPL-SCNC: 114 MMOL/L (ref 98–107)
CO2 SERPL-SCNC: 22 MMOL/L (ref 22–29)
CREAT SERPL-MCNC: 0.77 MG/DL (ref 0.55–1.02)
EOSINOPHIL # BLD AUTO: 0.1 X10(3)/MCL (ref 0–0.9)
EOSINOPHIL NFR BLD AUTO: 1.5 %
ERYTHROCYTE [DISTWIDTH] IN BLOOD BY AUTOMATED COUNT: 12.8 % (ref 11.5–17)
GFR SERPLBLD CREATININE-BSD FMLA CKD-EPI: >60 MLS/MIN/1.73/M2
GLOBULIN SER-MCNC: 3.4 GM/DL (ref 2.4–3.5)
GLUCOSE SERPL-MCNC: 93 MG/DL (ref 74–100)
HCT VFR BLD AUTO: 41.4 % (ref 37–47)
HGB BLD-MCNC: 13.5 G/DL (ref 12–16)
HOLD SPECIMEN: NORMAL
IMM GRANULOCYTES # BLD AUTO: 0.01 X10(3)/MCL (ref 0–0.04)
IMM GRANULOCYTES NFR BLD AUTO: 0.2 %
LYMPHOCYTES # BLD AUTO: 2.51 X10(3)/MCL (ref 0.6–4.6)
LYMPHOCYTES NFR BLD AUTO: 38.1 %
MCH RBC QN AUTO: 28.2 PG (ref 27–31)
MCHC RBC AUTO-ENTMCNC: 32.6 G/DL (ref 33–36)
MCV RBC AUTO: 86.6 FL (ref 80–94)
MONOCYTES # BLD AUTO: 0.52 X10(3)/MCL (ref 0.1–1.3)
MONOCYTES NFR BLD AUTO: 7.9 %
NEUTROPHILS # BLD AUTO: 3.39 X10(3)/MCL (ref 2.1–9.2)
NEUTROPHILS NFR BLD AUTO: 51.5 %
NRBC BLD AUTO-RTO: 0 %
PLATELET # BLD AUTO: 194 X10(3)/MCL (ref 130–400)
PMV BLD AUTO: 10.8 FL (ref 7.4–10.4)
POTASSIUM SERPL-SCNC: 3.1 MMOL/L (ref 3.5–5.1)
PROT SERPL-MCNC: 7.5 GM/DL (ref 6.4–8.3)
RBC # BLD AUTO: 4.78 X10(6)/MCL (ref 4.2–5.4)
SODIUM SERPL-SCNC: 146 MMOL/L (ref 136–145)
WBC # SPEC AUTO: 6.58 X10(3)/MCL (ref 4.5–11.5)

## 2023-12-05 PROCEDURE — 80053 COMPREHEN METABOLIC PANEL: CPT

## 2023-12-05 PROCEDURE — 85025 COMPLETE CBC W/AUTO DIFF WBC: CPT

## 2023-12-05 PROCEDURE — 99284 EMERGENCY DEPT VISIT MOD MDM: CPT

## 2023-12-05 PROCEDURE — 25000003 PHARM REV CODE 250

## 2023-12-05 RX ORDER — HYDROCODONE BITARTRATE AND ACETAMINOPHEN 10; 325 MG/1; MG/1
1 TABLET ORAL
Status: COMPLETED | OUTPATIENT
Start: 2023-12-05 | End: 2023-12-05

## 2023-12-05 RX ADMIN — HYDROCODONE BITARTRATE AND ACETAMINOPHEN 1 TABLET: 10; 325 TABLET ORAL at 05:12
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
## 2023-12-06 ENCOUNTER — HOSPITAL ENCOUNTER (EMERGENCY)
Facility: HOSPITAL | Age: 38
Discharge: HOME | End: 2023-12-06
Attending: STUDENT IN AN ORGANIZED HEALTH CARE EDUCATION/TRAINING PROGRAM
Payer: MEDICAID

## 2023-12-06 VITALS
TEMPERATURE: 98 F | SYSTOLIC BLOOD PRESSURE: 143 MMHG | HEART RATE: 84 BPM | WEIGHT: 160 LBS | HEIGHT: 63 IN | RESPIRATION RATE: 18 BRPM | DIASTOLIC BLOOD PRESSURE: 94 MMHG | OXYGEN SATURATION: 98 % | BODY MASS INDEX: 28.35 KG/M2

## 2023-12-06 DIAGNOSIS — R10.31 CHRONIC BILATERAL LOWER ABDOMINAL PAIN: Primary | ICD-10-CM

## 2023-12-06 DIAGNOSIS — N80.9 ENDOMETRIOSIS: ICD-10-CM

## 2023-12-06 DIAGNOSIS — G89.29 CHRONIC BILATERAL LOWER ABDOMINAL PAIN: Primary | ICD-10-CM

## 2023-12-06 DIAGNOSIS — R10.32 CHRONIC BILATERAL LOWER ABDOMINAL PAIN: Primary | ICD-10-CM

## 2023-12-06 LAB
ALBUMIN SERPL-MCNC: 4 G/DL (ref 3.5–5)
ALBUMIN/GLOB SERPL: 1.1 RATIO (ref 1.1–2)
ALP SERPL-CCNC: 92 UNIT/L (ref 40–150)
ALT SERPL-CCNC: 13 UNIT/L (ref 0–55)
APPEARANCE UR: CLEAR
AST SERPL-CCNC: 11 UNIT/L (ref 5–34)
BACTERIA #/AREA URNS AUTO: ABNORMAL /HPF
BASOPHILS # BLD AUTO: 0.06 X10(3)/MCL
BASOPHILS NFR BLD AUTO: 0.9 %
BILIRUB SERPL-MCNC: 0.3 MG/DL
BILIRUB UR QL STRIP.AUTO: NEGATIVE
BUN SERPL-MCNC: 14.4 MG/DL (ref 7–18.7)
CALCIUM SERPL-MCNC: 9.9 MG/DL (ref 8.4–10.2)
CHLORIDE SERPL-SCNC: 113 MMOL/L (ref 98–107)
CO2 SERPL-SCNC: 24 MMOL/L (ref 22–29)
COLOR UR AUTO: YELLOW
CREAT SERPL-MCNC: 0.98 MG/DL (ref 0.55–1.02)
EOSINOPHIL # BLD AUTO: 0.15 X10(3)/MCL (ref 0–0.9)
EOSINOPHIL NFR BLD AUTO: 2.3 %
ERYTHROCYTE [DISTWIDTH] IN BLOOD BY AUTOMATED COUNT: 12.9 % (ref 11.5–17)
GFR SERPLBLD CREATININE-BSD FMLA CKD-EPI: >60 MLS/MIN/1.73/M2
GLOBULIN SER-MCNC: 3.6 GM/DL (ref 2.4–3.5)
GLUCOSE SERPL-MCNC: 120 MG/DL (ref 74–100)
GLUCOSE UR QL STRIP.AUTO: NEGATIVE
HCT VFR BLD AUTO: 41.7 % (ref 37–47)
HGB BLD-MCNC: 13.5 G/DL (ref 12–16)
IMM GRANULOCYTES # BLD AUTO: 0.01 X10(3)/MCL (ref 0–0.04)
IMM GRANULOCYTES NFR BLD AUTO: 0.2 %
KETONES UR QL STRIP.AUTO: ABNORMAL
LEUKOCYTE ESTERASE UR QL STRIP.AUTO: NEGATIVE
LYMPHOCYTES # BLD AUTO: 2.49 X10(3)/MCL (ref 0.6–4.6)
LYMPHOCYTES NFR BLD AUTO: 37.6 %
MCH RBC QN AUTO: 28.1 PG (ref 27–31)
MCHC RBC AUTO-ENTMCNC: 32.4 G/DL (ref 33–36)
MCV RBC AUTO: 86.9 FL (ref 80–94)
MONOCYTES # BLD AUTO: 0.63 X10(3)/MCL (ref 0.1–1.3)
MONOCYTES NFR BLD AUTO: 9.5 %
NEUTROPHILS # BLD AUTO: 3.28 X10(3)/MCL (ref 2.1–9.2)
NEUTROPHILS NFR BLD AUTO: 49.5 %
NITRITE UR QL STRIP.AUTO: NEGATIVE
NRBC BLD AUTO-RTO: 0 %
PH UR STRIP.AUTO: 6.5 [PH]
PLATELET # BLD AUTO: 199 X10(3)/MCL (ref 130–400)
PMV BLD AUTO: 10.9 FL (ref 7.4–10.4)
POTASSIUM SERPL-SCNC: 3.3 MMOL/L (ref 3.5–5.1)
PROT SERPL-MCNC: 7.6 GM/DL (ref 6.4–8.3)
PROT UR QL STRIP.AUTO: NEGATIVE
RBC # BLD AUTO: 4.8 X10(6)/MCL (ref 4.2–5.4)
RBC #/AREA URNS AUTO: ABNORMAL /HPF
RBC UR QL AUTO: ABNORMAL
SODIUM SERPL-SCNC: 145 MMOL/L (ref 136–145)
SP GR UR STRIP.AUTO: 1.02 (ref 1–1.03)
SQUAMOUS #/AREA URNS AUTO: ABNORMAL /HPF
UROBILINOGEN UR STRIP-ACNC: 0.2
WBC # SPEC AUTO: 6.62 X10(3)/MCL (ref 4.5–11.5)
WBC #/AREA URNS AUTO: ABNORMAL /HPF

## 2023-12-06 PROCEDURE — 80053 COMPREHEN METABOLIC PANEL: CPT | Performed by: STUDENT IN AN ORGANIZED HEALTH CARE EDUCATION/TRAINING PROGRAM

## 2023-12-06 PROCEDURE — 96374 THER/PROPH/DIAG INJ IV PUSH: CPT | Mod: 59

## 2023-12-06 PROCEDURE — 25500020 PHARM REV CODE 255: Performed by: STUDENT IN AN ORGANIZED HEALTH CARE EDUCATION/TRAINING PROGRAM

## 2023-12-06 PROCEDURE — 85025 COMPLETE CBC W/AUTO DIFF WBC: CPT | Performed by: STUDENT IN AN ORGANIZED HEALTH CARE EDUCATION/TRAINING PROGRAM

## 2023-12-06 PROCEDURE — 81001 URINALYSIS AUTO W/SCOPE: CPT | Performed by: STUDENT IN AN ORGANIZED HEALTH CARE EDUCATION/TRAINING PROGRAM

## 2023-12-06 PROCEDURE — 63600175 PHARM REV CODE 636 W HCPCS: Performed by: STUDENT IN AN ORGANIZED HEALTH CARE EDUCATION/TRAINING PROGRAM

## 2023-12-06 PROCEDURE — 96375 TX/PRO/DX INJ NEW DRUG ADDON: CPT

## 2023-12-06 PROCEDURE — 99285 EMERGENCY DEPT VISIT HI MDM: CPT | Mod: 25

## 2023-12-06 RX ORDER — ONDANSETRON 2 MG/ML
4 INJECTION INTRAMUSCULAR; INTRAVENOUS
Status: COMPLETED | OUTPATIENT
Start: 2023-12-06 | End: 2023-12-06

## 2023-12-06 RX ORDER — MORPHINE SULFATE 4 MG/ML
4 INJECTION, SOLUTION INTRAMUSCULAR; INTRAVENOUS
Status: COMPLETED | OUTPATIENT
Start: 2023-12-06 | End: 2023-12-06

## 2023-12-06 RX ORDER — KETOROLAC TROMETHAMINE 30 MG/ML
30 INJECTION, SOLUTION INTRAMUSCULAR; INTRAVENOUS
Status: DISCONTINUED | OUTPATIENT
Start: 2023-12-06 | End: 2023-12-06

## 2023-12-06 RX ADMIN — MORPHINE SULFATE 4 MG: 4 INJECTION, SOLUTION INTRAMUSCULAR; INTRAVENOUS at 09:12

## 2023-12-06 RX ADMIN — ONDANSETRON 4 MG: 2 INJECTION INTRAMUSCULAR; INTRAVENOUS at 09:12

## 2023-12-06 RX ADMIN — IOHEXOL 100 ML: 350 INJECTION, SOLUTION INTRAVENOUS at 10:12

## 2023-12-07 NOTE — ED TRIAGE NOTES
Pt having abd pain x 1 week w/ n/v x 1 / seen at [facility] last night for the same. Pt states she needs a surgery but hasn't had it yet.

## 2023-12-07 NOTE — ED PROVIDER NOTES
Encounter Date: 2023       History     Chief Complaint   Patient presents with    Abdominal Pain    Vomiting     HPI    38-year-old female with a past medical history of endometriosis and hypertension presents emergency department for severe abdominal pain equal.  Patient states that is located to left lower quadrant.  States that she has a history of endometriosis and needs surgery but having issue getting it scheduled.  She thinks it might be a flare of that.  States that the pain is causing him to have nausea and vomiting.  No fevers or chills.  No burning on urination.    Review of patient's allergies indicates:  No Known Allergies  Past Medical History:   Diagnosis Date    Bowel obstruction     Endometriosis     Hypertension      Past Surgical History:   Procedure Laterality Date     SECTION      hernia repair      PARTIAL HYSTERECTOMY      REPAIR OF VAGINAL CUFF       Family History   Problem Relation Age of Onset    Cancer Father     Hypertension Father     Diabetes Father     Hypertension Mother     Diabetes Mother     Diabetes Sister     Hypertension Sister      Social History     Tobacco Use    Smoking status: Former     Types: Cigarettes    Smokeless tobacco: Current   Substance Use Topics    Alcohol use: Not Currently    Drug use: Not Currently     Review of Systems   Constitutional:  Negative for fever.   HENT:  Negative for sore throat.    Respiratory:  Negative for cough and shortness of breath.    Cardiovascular:  Negative for chest pain.   Gastrointestinal:  Positive for abdominal pain, nausea and vomiting. Negative for constipation and diarrhea.   Genitourinary:  Negative for dysuria.   Musculoskeletal:  Negative for back pain.   Skin:  Negative for rash.   Neurological:  Negative for weakness and headaches.   Hematological:  Does not bruise/bleed easily.   All other systems reviewed and are negative.      Physical Exam     Initial Vitals [23 2103]   BP Pulse Resp Temp SpO2    (!) 177/129 107 18 97.9 °F (36.6 °C) 100 %      MAP       --         Physical Exam    Nursing note and vitals reviewed.  Constitutional: She appears well-developed and well-nourished. No distress.   Cardiovascular:  Normal rate and regular rhythm.           Pulmonary/Chest: Breath sounds normal. No respiratory distress. She has no wheezes. She has no rhonchi. She has no rales.   Abdominal: Abdomen is soft. There is abdominal tenderness (generalized lower abdominal). There is no rebound and no guarding.   Musculoskeletal:         General: No tenderness. Normal range of motion.     Neurological: She is alert and oriented to person, place, and time. She has normal strength.   Skin: Skin is warm. Capillary refill takes less than 2 seconds.         ED Course   Procedures  Labs Reviewed   COMPREHENSIVE METABOLIC PANEL - Abnormal; Notable for the following components:       Result Value    Potassium Level 3.3 (*)     Chloride 113 (*)     Glucose Level 120 (*)     Globulin 3.6 (*)     All other components within normal limits   URINALYSIS, REFLEX TO URINE CULTURE - Abnormal; Notable for the following components:    Ketones, UA Trace (*)     Blood, UA Small (*)     All other components within normal limits   CBC WITH DIFFERENTIAL - Abnormal; Notable for the following components:    MCHC 32.4 (*)     MPV 10.9 (*)     All other components within normal limits   URINALYSIS, MICROSCOPIC - Abnormal; Notable for the following components:    Squamous Epithelial Cells, UA Few (*)     All other components within normal limits   CBC W/ AUTO DIFFERENTIAL    Narrative:     The following orders were created for panel order CBC auto differential.  Procedure                               Abnormality         Status                     ---------                               -----------         ------                     CBC with Differential[[id number]]       Abnormal            Final result                 Please view results for these  tests on the individual orders.          Imaging Results              CT Abdomen Pelvis With IV Contrast NO Oral Contrast (Preliminary result)  Result time 12/06/23 23:11:25      Preliminary result by Tony Chapman Jr., MD (12/06/23 23:11:25)                   Narrative:    START OF REPORT:  Technique: CT of the abdomen and pelvis was performed with axial images as well as sagittal and coronal reconstruction images with intravenous contrast but without oral contrast.    Comparison: Comparison is with study dated2023-10-03 16:40:58.    Clinical History: Pelvic pain.    Dosage Information: Automated Exposure Control was utilized.    Findings: No significant interval change as compared with the prior examination.  Lines and Tubes: None.  Thorax:  Lungs: The visualized lung bases appear unremarkable.  Pleura: No pleural effusion is seen.  Heart: The heart size is within normal limits.  Abdomen:  Abdominal Wall: No abdominal wall pathology is seen.  Liver: The liver appears unremarkable.  Biliary System: No intrahepatic or extrahepatic biliary duct dilatation is seen.  Gallbladder: Surgical clips are seen in the gallbladder fossa consistent with prior cholecystectomy.  Pancreas: The pancreas appears unremarkable.  Spleen: The spleen appears unremarkable.  Adrenals: The adrenal glands appear unremarkable.  Kidneys: The kidneys appear unremarkable with no stones cysts masses or hydronephrosis.  Aorta: The abdominal aorta appears unremarkable.  IVC: Unremarkable.  Bowel:  Esophagus: The visualized esophagus appears unremarkable.  Stomach: The stomach appears unremarkable.  Duodenum: Unremarkable appearing duodenum.  Small Bowel: The small bowel appears unremarkable.  Colon: Nondistended.  Appendix: The appendix appears unremarkable (series 2 image 50-58 ).  Peritoneum: No intraperitoneal free air or ascites is seen.    Pelvis:  Bladder: The bladder is nondistended and cannot be definitively  evaluated.  Female:  Uterus: The uterus is surgically absent.  Ovaries: No adnexal masses are seen.    Bony structures:  Dorsal Spine: The visualized dorsal spine appears unremarkable.  Bony Pelvis: The visualized bony structures of the pelvis appear unremarkable.      Impression:  1. No acute intraabdominal or pelvic solid organ or bowel pathology identified. Details and findings as discussed.                                         Medications   morphine injection 4 mg (4 mg Intravenous Given 12/6/23 2145)   ondansetron injection 4 mg (4 mg Intravenous Given 12/6/23 2144)   iohexoL (OMNIPAQUE 350) injection 100 mL (100 mLs Intravenous Given 12/6/23 2244)     Medical Decision Making  differential diagnosis  UTI, endometriosis, diverticulitis, constipation,  as well as multiple other possible etiologies      Problems Addressed:  Chronic bilateral lower abdominal pain: chronic illness or injury  Endometriosis: chronic illness or injury    Amount and/or Complexity of Data Reviewed  External Data Reviewed: labs and notes.  Labs: ordered. Decision-making details documented in ED Course.  Radiology: ordered.    Risk  Prescription drug management.               ED Course as of 12/06/23 2313   Wed Dec 06, 2023   2137 Original plan was to give patient a shot of Toradol although she took Toradol just prior to arrival.  Will give her some morphine and Zofran [BS]   2150 WBC: 6.62 [BS]   2150 Hemoglobin: 13.5 [BS]   2150 Hematocrit: 41.7 [BS]   2150 Platelet Count: 199 [BS]      ED Course User Index  [BS] Hieu Henriquez MD                           Clinical Impression:  Final diagnoses:  [R10.31, G89.29, R10.32] Chronic bilateral lower abdominal pain (Primary)  [N80.9] Endometriosis          ED Disposition Condition    Discharge Stable          ED Prescriptions    None       Follow-up Information       Follow up With Specialties Details Why Contact Info    Daniela Lewis FNP Emergency Medicine Schedule an  appointment as soon as possible for a visit   [address]  [facility] [address]  [phone]      [facility] - Emergency Dept Emergency Medicine Go to  If symptoms worsen [address]  [facility] [address] [phone]    Follow-up with Hieu Huang MD  12/06/23 0880

## 2023-12-10 ENCOUNTER — HOSPITAL ENCOUNTER (EMERGENCY)
Facility: HOSPITAL | Age: 38
Discharge: HOME | End: 2023-12-10
Attending: EMERGENCY MEDICINE
Payer: MEDICAID

## 2023-12-10 VITALS
HEIGHT: 63 IN | RESPIRATION RATE: 18 BRPM | DIASTOLIC BLOOD PRESSURE: 106 MMHG | SYSTOLIC BLOOD PRESSURE: 146 MMHG | BODY MASS INDEX: 30.08 KG/M2 | OXYGEN SATURATION: 100 % | TEMPERATURE: 98 F | HEART RATE: 104 BPM | WEIGHT: 169.75 LBS

## 2023-12-10 DIAGNOSIS — R11.2 NAUSEA AND VOMITING, UNSPECIFIED VOMITING TYPE: ICD-10-CM

## 2023-12-10 DIAGNOSIS — N80.9 ENDOMETRIOSIS: ICD-10-CM

## 2023-12-10 DIAGNOSIS — R10.9 CHRONIC ABDOMINAL PAIN: Primary | ICD-10-CM

## 2023-12-10 DIAGNOSIS — G89.29 CHRONIC ABDOMINAL PAIN: Primary | ICD-10-CM

## 2023-12-10 LAB
ALBUMIN SERPL-MCNC: 4.4 G/DL (ref 3.5–5)
ALBUMIN/GLOB SERPL: 1.1 RATIO (ref 1.1–2)
ALP SERPL-CCNC: 97 UNIT/L (ref 40–150)
ALT SERPL-CCNC: 13 UNIT/L (ref 0–55)
APPEARANCE UR: CLEAR
AST SERPL-CCNC: 12 UNIT/L (ref 5–34)
BACTERIA #/AREA URNS AUTO: ABNORMAL /HPF
BASOPHILS # BLD AUTO: 0.05 X10(3)/MCL
BASOPHILS NFR BLD AUTO: 0.9 %
BILIRUB SERPL-MCNC: 0.3 MG/DL
BILIRUB UR QL STRIP.AUTO: NEGATIVE
BUN SERPL-MCNC: 7.7 MG/DL (ref 7–18.7)
CALCIUM SERPL-MCNC: 10.2 MG/DL (ref 8.4–10.2)
CHLORIDE SERPL-SCNC: 108 MMOL/L (ref 98–107)
CO2 SERPL-SCNC: 25 MMOL/L (ref 22–29)
COLOR UR AUTO: COLORLESS
CREAT SERPL-MCNC: 0.84 MG/DL (ref 0.55–1.02)
EOSINOPHIL # BLD AUTO: 0.08 X10(3)/MCL (ref 0–0.9)
EOSINOPHIL NFR BLD AUTO: 1.4 %
ERYTHROCYTE [DISTWIDTH] IN BLOOD BY AUTOMATED COUNT: 12.8 % (ref 11.5–17)
GFR SERPLBLD CREATININE-BSD FMLA CKD-EPI: >60 MLS/MIN/1.73/M2
GLOBULIN SER-MCNC: 3.9 GM/DL (ref 2.4–3.5)
GLUCOSE SERPL-MCNC: 115 MG/DL (ref 74–100)
GLUCOSE UR QL STRIP.AUTO: NORMAL
HCT VFR BLD AUTO: 42.9 % (ref 37–47)
HGB BLD-MCNC: 13.7 G/DL (ref 12–16)
HOLD SPECIMEN: NORMAL
HYALINE CASTS #/AREA URNS LPF: ABNORMAL /LPF
IMM GRANULOCYTES # BLD AUTO: 0.01 X10(3)/MCL (ref 0–0.04)
IMM GRANULOCYTES NFR BLD AUTO: 0.2 %
KETONES UR QL STRIP.AUTO: NEGATIVE
LEUKOCYTE ESTERASE UR QL STRIP.AUTO: NEGATIVE
LYMPHOCYTES # BLD AUTO: 1.93 X10(3)/MCL (ref 0.6–4.6)
LYMPHOCYTES NFR BLD AUTO: 34.2 %
MCH RBC QN AUTO: 27.6 PG (ref 27–31)
MCHC RBC AUTO-ENTMCNC: 31.9 G/DL (ref 33–36)
MCV RBC AUTO: 86.3 FL (ref 80–94)
MONOCYTES # BLD AUTO: 0.42 X10(3)/MCL (ref 0.1–1.3)
MONOCYTES NFR BLD AUTO: 7.4 %
NEUTROPHILS # BLD AUTO: 3.15 X10(3)/MCL (ref 2.1–9.2)
NEUTROPHILS NFR BLD AUTO: 55.9 %
NITRITE UR QL STRIP.AUTO: NEGATIVE
NRBC BLD AUTO-RTO: 0 %
PH UR STRIP.AUTO: 7 [PH]
PLATELET # BLD AUTO: 225 X10(3)/MCL (ref 130–400)
PMV BLD AUTO: 10.8 FL (ref 7.4–10.4)
POTASSIUM SERPL-SCNC: 3.1 MMOL/L (ref 3.5–5.1)
PROT SERPL-MCNC: 8.3 GM/DL (ref 6.4–8.3)
PROT UR QL STRIP.AUTO: NEGATIVE
RBC # BLD AUTO: 4.97 X10(6)/MCL (ref 4.2–5.4)
RBC #/AREA URNS AUTO: ABNORMAL /HPF
RBC UR QL AUTO: NEGATIVE
SODIUM SERPL-SCNC: 145 MMOL/L (ref 136–145)
SP GR UR STRIP.AUTO: <1.005 (ref 1–1.03)
SQUAMOUS #/AREA URNS LPF: ABNORMAL /HPF
UROBILINOGEN UR STRIP-ACNC: NORMAL
WBC # SPEC AUTO: 5.64 X10(3)/MCL (ref 4.5–11.5)
WBC #/AREA URNS AUTO: ABNORMAL /HPF

## 2023-12-10 PROCEDURE — 96375 TX/PRO/DX INJ NEW DRUG ADDON: CPT

## 2023-12-10 PROCEDURE — 85025 COMPLETE CBC W/AUTO DIFF WBC: CPT | Performed by: NURSE PRACTITIONER

## 2023-12-10 PROCEDURE — 25000003 PHARM REV CODE 250: Performed by: NURSE PRACTITIONER

## 2023-12-10 PROCEDURE — 96374 THER/PROPH/DIAG INJ IV PUSH: CPT

## 2023-12-10 PROCEDURE — 63600175 PHARM REV CODE 636 W HCPCS: Performed by: NURSE PRACTITIONER

## 2023-12-10 PROCEDURE — 96372 THER/PROPH/DIAG INJ SC/IM: CPT | Performed by: NURSE PRACTITIONER

## 2023-12-10 PROCEDURE — 81001 URINALYSIS AUTO W/SCOPE: CPT | Performed by: NURSE PRACTITIONER

## 2023-12-10 PROCEDURE — 80053 COMPREHEN METABOLIC PANEL: CPT | Performed by: NURSE PRACTITIONER

## 2023-12-10 PROCEDURE — 99284 EMERGENCY DEPT VISIT MOD MDM: CPT

## 2023-12-10 RX ORDER — DICYCLOMINE HYDROCHLORIDE 10 MG/ML
20 INJECTION INTRAMUSCULAR
Status: COMPLETED | OUTPATIENT
Start: 2023-12-10 | End: 2023-12-10

## 2023-12-10 RX ORDER — MORPHINE SULFATE 2 MG/ML
4 INJECTION, SOLUTION INTRAMUSCULAR; INTRAVENOUS
Status: COMPLETED | OUTPATIENT
Start: 2023-12-10 | End: 2023-12-10

## 2023-12-10 RX ORDER — METOCLOPRAMIDE HYDROCHLORIDE 5 MG/ML
10 INJECTION INTRAMUSCULAR; INTRAVENOUS
Status: COMPLETED | OUTPATIENT
Start: 2023-12-10 | End: 2023-12-10

## 2023-12-10 RX ORDER — METOCLOPRAMIDE 10 MG/1
10 TABLET ORAL EVERY 6 HOURS PRN
Qty: 16 TABLET | Refills: 0 | Status: SHIPPED | OUTPATIENT
Start: 2023-12-10 | End: 2023-12-14

## 2023-12-10 RX ORDER — INDOMETHACIN 25 MG/1
25 CAPSULE ORAL 2 TIMES DAILY PRN
Qty: 14 CAPSULE | Refills: 0 | Status: SHIPPED | OUTPATIENT
Start: 2023-12-10

## 2023-12-10 RX ADMIN — DICYCLOMINE HYDROCHLORIDE 20 MG: 20 INJECTION, SOLUTION INTRAMUSCULAR at 02:12

## 2023-12-10 RX ADMIN — METOCLOPRAMIDE HYDROCHLORIDE 10 MG: 5 INJECTION INTRAMUSCULAR; INTRAVENOUS at 02:12

## 2023-12-10 RX ADMIN — MORPHINE SULFATE 4 MG: 2 INJECTION, SOLUTION INTRAMUSCULAR; INTRAVENOUS at 03:12

## 2023-12-10 RX ADMIN — SODIUM CHLORIDE 1000 ML: 9 INJECTION, SOLUTION INTRAVENOUS at 02:12

## 2023-12-10 NOTE — ED PROVIDER NOTES
Encounter Date: 12/10/2023       History     Chief Complaint   Patient presents with    Pelvic Pain     C/o pelvic pain x 1 week. States has endometriosis and is on meds but with no relief.      Pt is a 38 y.o. female who presents to the [facility] ED complaining of nausea, vomiting, and generalized abdominal pain. Hx of endometriosis. Seen for same issue at [facility] ED on . Had full diagnostic work up including a CT of her abdomen with no acute findings. Pt reports taking her home medications as directed however she had developed nausea and vomiting over the last 2 days. Denies chest pain, SOB, weakness, dizziness, fever, or loss of bowel or bladder control. Last BM was this AM and without bloody, black, or maroon coloration. Denies vaginal bleeding or discharge.      Review of patient's allergies indicates:  No Known Allergies  Past Medical History:   Diagnosis Date    Bowel obstruction     Endometriosis     Hypertension      Past Surgical History:   Procedure Laterality Date     SECTION      hernia repair      PARTIAL HYSTERECTOMY      REPAIR OF VAGINAL CUFF       Family History   Problem Relation Age of Onset    Cancer Father     Hypertension Father     Diabetes Father     Hypertension Mother     Diabetes Mother     Diabetes Sister     Hypertension Sister      Social History     Tobacco Use    Smoking status: Former     Types: Cigarettes    Smokeless tobacco: Current   Substance Use Topics    Alcohol use: Not Currently    Drug use: Not Currently     Review of Systems   Constitutional:  Negative for chills, diaphoresis, fatigue and fever.   HENT:  Negative for facial swelling, rhinorrhea, sinus pressure, sinus pain, sore throat and trouble swallowing.    Respiratory:  Negative for cough, chest tightness, shortness of breath and wheezing.    Cardiovascular:  Negative for chest pain, palpitations and leg swelling.   Gastrointestinal:  Positive for abdominal pain, nausea and vomiting. Negative for diarrhea.    Genitourinary:  Negative for dysuria, flank pain, frequency, hematuria and urgency.   Musculoskeletal:  Negative for arthralgias, back pain, joint swelling and myalgias.   Skin:  Negative for color change and rash.   Neurological:  Negative for dizziness, syncope, weakness and light-headedness.   Hematological:  Does not bruise/bleed easily.   All other systems reviewed and are negative.      Physical Exam     Initial Vitals [12/10/23 1258]   BP Pulse Resp Temp SpO2   (!) 146/106 104 20 98.4 °F (36.9 °C) 100 %      MAP       --         Physical Exam    Nursing note and vitals reviewed.  Constitutional: She appears well-developed and well-nourished.   HENT:   Head: Normocephalic and atraumatic.   Nose: Nose normal.   Mouth/Throat: Oropharynx is clear and moist.   Eyes: Conjunctivae and EOM are normal. Pupils are equal, round, and reactive to light.   Neck: Neck supple.   Normal range of motion.  Cardiovascular:  Normal rate, regular rhythm, normal heart sounds and intact distal pulses.           Pulmonary/Chest: Effort normal and breath sounds normal. No respiratory distress. She has no wheezes. She has no rhonchi. She has no rales. She exhibits no tenderness.   Abdominal: Abdomen is soft and flat. Bowel sounds are normal. She exhibits no distension. There is no abdominal tenderness.     There is no rebound, no guarding, no tenderness at McBurney's point and negative Larios's sign. negative psoas sign  Musculoskeletal:         General: Normal range of motion.      Cervical back: Normal range of motion and neck supple.     Neurological: She is alert and oriented to person, place, and time. She has normal strength and normal reflexes.   Skin: Skin is warm and dry. Capillary refill takes less than 2 seconds.   Psychiatric: She has a normal mood and affect. Her speech is normal and behavior is normal. Judgment and thought content normal.         ED Course   Procedures  Labs Reviewed   COMPREHENSIVE METABOLIC PANEL -  Abnormal; Notable for the following components:       Result Value    Potassium Level 3.1 (*)     Chloride 108 (*)     Glucose Level 115 (*)     Globulin 3.9 (*)     All other components within normal limits   URINALYSIS, REFLEX TO URINE CULTURE - Abnormal; Notable for the following components:    Color, UA Colorless (*)     Specific Gravity, UA <1.005 (*)     Squamous Epithelial Cells, UA Occ (*)     All other components within normal limits   CBC WITH DIFFERENTIAL - Abnormal; Notable for the following components:    MCHC 31.9 (*)     MPV 10.8 (*)     All other components within normal limits   CBC W/ AUTO DIFFERENTIAL    Narrative:     The following orders were created for panel order CBC auto differential.  Procedure                               Abnormality         Status                     ---------                               -----------         ------                     CBC with Differential[[id number]]       Abnormal            Final result                 Please view results for these tests on the individual orders.   EXTRA TUBES    Narrative:     The following orders were created for panel order EXTRA TUBES.  Procedure                               Abnormality         Status                     ---------                               -----------         ------                     Light Blue Top Hold[[id number]]                             In process                 Red Top Hold[[id number]]                                    In process                 Light Green Top Hold[[id number]]                            In process                 Lavender Top Hold[[id number]]                               In process                 Gold Top Hold[[id number]]                                   In process                 Pink Top Hold[[id number]]                                   In process                   Please view results for these tests on the individual orders.   LIGHT BLUE TOP HOLD   RED TOP HOLD   LIGHT  GREEN TOP HOLD   LAVENDER TOP HOLD   GOLD TOP HOLD   PINK TOP HOLD          Imaging Results    None          Medications   sodium chloride 0.9% bolus 999 mL 999 mL (1,000 mLs Intravenous New Bag 12/10/23 1443)   morphine injection 4 mg (has no administration in time range)   metoclopramide HCl injection 10 mg (10 mg Intravenous Given 12/10/23 1433)   dicyclomine injection 20 mg (20 mg Intramuscular Given 12/10/23 1434)     Medical Decision Making  Differential:  Endometriosis  Gastroenteritis  Electrolyte imbalance  Anemia  Abdominal pain    Amount and/or Complexity of Data Reviewed  External Data Reviewed: radiology.     Details: CT Abdomen Pelvis With IV Contrast NO Oral Contrast  Order: [id number]  Status: Final result       Visible to patient: Yes (not seen)       Next appt: 01/22/2024 at 10:45 AM in Gynecology ([facility])    0 Result Notes  Details      Reading Physician Reading Date Result Priority  Tony Chapman Jr., MD  [phone] 12/6/2023 STAT  Sourav Whitney MD  [phone] 12/7/2023     Narrative & Impression     Technique:CT of the abdomen and pelvis was performed with axial images as well as sagittal and coronal reconstruction images with intravenous contrast but without oral contrast.     Comparison:Comparison is with study dated 2023-10-03 16:40:58.     Clinical History:Pelvic pain.     Dosage Information:Automated Exposure Control was utilized.       Findings:No significant interval change as compared with the prior examination.     Lines and Tubes:None.     Thorax:     Lungs:The visualized lung bases appear unremarkable.     Pleura:No pleural effusion is seen.     Abdomen:     Abdominal Wall:No abdominal wall pathology is seen.     Liver:The liver appears unremarkable.     Biliary System:No intrahepatic or extrahepatic biliary duct dilatation is seen.     Gallbladder:Surgical clips are seen in the gallbladder fossa consistent with prior cholecystectomy.      Pancreas:The pancreas appears unremarkable.     Spleen:The spleen appears unremarkable.     Adrenals:The adrenal glands appear unremarkable.     Kidneys:The kidneys appear unremarkable with no stones cysts masses or hydronephrosis.     Aorta:The abdominal aorta appears unremarkable.     IVC:Unremarkable.     Bowel:     Esophagus:The visualized esophagus appears unremarkable.     Stomach:The stomach appears unremarkable.     Duodenum:Unremarkable appearing duodenum.     Small Bowel:The small bowel appears unremarkable.     Colon:Nondistended.     Appendix:The appendix appears unremarkable (series 2 image 50-58 ).     Peritoneum:No intraperitoneal free air or ascites is seen.     Pelvis:     Bladder:The bladder is nondistended and cannot be definitively evaluated.     Female:     Uterus:The uterus is surgically absent.     Ovaries:No adnexal masses are seen.     Bony structures:     Dorsal Spine:The visualized dorsal spine appears unremarkable.     Bony Pelvis:The visualized bony structures of the pelvis appear unremarkable.     Impression:  Impression:     No acute intraabdominal or pelvic solid organ or bowel pathology identified. Details and findings as discussed.     No significant discrepancy with overnight report.        Electronically signed by: Sourav Whitney  Date:                                            12/07/2023  Time:                                           07:08      Labs: ordered.    Risk  Prescription drug management.               ED Course as of 12/10/23 1526   Sun Dec 10, 2023   1522 3:22 PM Reassessed patient at this time. Reports mild improvement in initial condition, requesting an additional medication prior to discharge. Pt given Morphine on 12/6 with symptoms relief, I will give her a single dose at this time and change her medications for home. Discussed plan with pt, understanding and agreement voiced. Discussed with patient all pertinent ED information and results. Discussed diagnosis  and treatment plan with patient. Follow up instructions and return to ED instruction have been given. All questions and concerns were addressed at this time. Patient voices understanding of information and instructions. Patient is comfortable with plan and discharge. Patient is stable for discharge.    [JA]      ED Course User Index  [JA] Hernán Ballesteros Jr., FNP                           Clinical Impression:  Final diagnoses:  [R10.9, G89.29] Chronic abdominal pain (Primary)  [R11.2] Nausea and vomiting, unspecified vomiting type  [N80.9] Endometriosis          ED Disposition Condition    Discharge Stable          ED Prescriptions       Medication Sig Dispense Start Date End Date Auth. Provider    indomethacin (INDOCIN) 25 MG capsule Take 1 capsule (25 mg total) by mouth 2 (two) times daily as needed (pain). 14 capsule 12/10/2023 -- Hernán Ballesteros Jr., [facility]    metoclopramide HCl (REGLAN) 10 MG tablet Take 1 tablet (10 mg total) by mouth every 6 (six) hours as needed (nausea). 16 tablet 12/10/2023 12/14/2023 Hernán Ballesteros Jr., FNP          Follow-up Information       Follow up With Specialties Details Why Contact Info    Daniela Lewis FNP Emergency Medicine In 3 days  [address] [facility] [address]  [phone]      [facility] - Emergency Dept Emergency Medicine In 3 days As needed, If symptoms worsen [address] [facility] [address] [phone]             Hernán Ballesteros Jr., FNP  12/10/23 8351

## 2024-03-14 ENCOUNTER — TELEPHONE (OUTPATIENT)
Dept: OBSTETRICS AND GYNECOLOGY | Facility: CLINIC | Age: 39
End: 2024-03-14
Payer: MEDICAID

## 2024-03-14 NOTE — TELEPHONE ENCOUNTER
Called pt no answer left vm     ----- Message from [employer] sent at 3/14/2024  9:46 AM CDT -----  Contact: self  Type:  Same Day Appointment Request    Caller is requesting a same day appointment.  Caller declined first available appointment listed below.    Name of Caller:Waldo Yu  When is the first available appointment?April  Symptoms:pelvic pain  Best Call Back Number:[phone]  Additional Information: n/a

## 2024-05-15 DIAGNOSIS — R10.9 ABDOMINAL PAIN: Primary | ICD-10-CM

## 2024-05-21 ENCOUNTER — OFFICE VISIT (OUTPATIENT)
Dept: UROLOGY | Facility: CLINIC | Age: 39
End: 2024-05-21
Payer: MEDICAID

## 2024-05-21 VITALS — BODY MASS INDEX: 30.08 KG/M2 | WEIGHT: 169.75 LBS | HEIGHT: 63 IN

## 2024-05-21 DIAGNOSIS — R31.9 HEMATURIA, UNDIAGNOSED CAUSE: Primary | ICD-10-CM

## 2024-05-21 DIAGNOSIS — R10.9 ABDOMINAL PAIN, UNSPECIFIED ABDOMINAL LOCATION: ICD-10-CM

## 2024-05-21 DIAGNOSIS — R89.9 ABNORMAL FINDINGS ON EXAMINATION OF GENITOURINARY ORGANS: ICD-10-CM

## 2024-05-21 LAB
BILIRUBIN, UA POC OHS: NEGATIVE
BLOOD, UA POC OHS: ABNORMAL
CLARITY, UA POC OHS: CLEAR
COLOR, UA POC OHS: YELLOW
GLUCOSE, UA POC OHS: NEGATIVE
KETONES, UA POC OHS: NEGATIVE
LEUKOCYTES, UA POC OHS: NEGATIVE
NITRITE, UA POC OHS: NEGATIVE
PH, UA POC OHS: 6
PROTEIN, UA POC OHS: NEGATIVE
SPECIFIC GRAVITY, UA POC OHS: 1.02
UROBILINOGEN, UA POC OHS: 0.2

## 2024-05-21 PROCEDURE — 1159F MED LIST DOCD IN RCRD: CPT | Mod: CPTII,S$GLB,,

## 2024-05-21 PROCEDURE — 3008F BODY MASS INDEX DOCD: CPT | Mod: CPTII,S$GLB,,

## 2024-05-21 PROCEDURE — 81003 URINALYSIS AUTO W/O SCOPE: CPT | Mod: QW,S$GLB,,

## 2024-05-21 PROCEDURE — 1160F RVW MEDS BY RX/DR IN RCRD: CPT | Mod: CPTII,S$GLB,,

## 2024-05-21 PROCEDURE — 4010F ACE/ARB THERAPY RXD/TAKEN: CPT | Mod: CPTII,S$GLB,,

## 2024-05-21 PROCEDURE — 99204 OFFICE O/P NEW MOD 45 MIN: CPT | Mod: S$GLB,,,

## 2024-05-21 RX ORDER — HYDROCHLOROTHIAZIDE 12.5 MG/1
TABLET ORAL
COMMUNITY
Start: 2024-05-13

## 2024-05-21 RX ORDER — IBUPROFEN 800 MG/1
TABLET ORAL
COMMUNITY
Start: 2024-04-02

## 2024-05-21 RX ORDER — LISINOPRIL 30 MG/1
TABLET ORAL
COMMUNITY
Start: 2024-05-13

## 2024-05-21 RX ORDER — PHENAZOPYRIDINE HYDROCHLORIDE 200 MG/1
200 TABLET, FILM COATED ORAL
COMMUNITY
Start: 2022-12-21

## 2024-05-21 RX ORDER — ONDANSETRON 4 MG/1
4 TABLET, ORALLY DISINTEGRATING ORAL
COMMUNITY
Start: 2022-10-16

## 2024-05-21 RX ORDER — DICLOFENAC SODIUM 75 MG/1
75 TABLET, DELAYED RELEASE ORAL
COMMUNITY
Start: 2023-08-21

## 2024-05-21 RX ORDER — DULOXETIN HYDROCHLORIDE 20 MG/1
CAPSULE, DELAYED RELEASE ORAL
COMMUNITY

## 2024-05-21 RX ORDER — HYDROCODONE BITARTRATE AND ACETAMINOPHEN 5; 325 MG/1; MG/1
TABLET ORAL
COMMUNITY
Start: 2024-03-26

## 2024-05-21 NOTE — PROGRESS NOTES
Subjective:       Patient ID: Waldo Yu is a 38 y.o. female.    Chief Complaint: No chief complaint on file.      HPI: 38-year-old female new patient referral from the ER for chronic abdominal pain.  Patient presented to the emergency room at [facility] on May 14th for severe abdominal pain with nausea and vomiting and underwent a CT without contrast which noted a 5 mm hyperdense structure involving the superior wall of the urinary bladder.  Patient reports in  she was having severe abdominal pressure and was referred to Dr. Pablo and underwent a cystoscopy with biopsy of the bladder wall which showed invasion of endometriosis.  Patient was then referred to Dr. de la torre in [place] who attempted to remove the endometriosis but was unsuccessful and ultimately sent the patient to Dr. Wasserman in [place].  Patient reports they perform surgery to remove the endometriosis from the bladder and she has not had any follow-ups with Dr. Wasserman since.    Today patient complains of ongoing lower abdominal pain, nausea vomiting, and severe dysuria.  She does report some intermittent gross hematuria.  Urinalysis today reveals negative WBCs, RBCs 10-15 small, +1 epithelial, +1 bacteria, +1 mucus, nitrite negative.  Patient reports she currently has prescriptions for hydrocodone, promethazine, gabapentin, Pyridium, and lorazepam, and Toradol.    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  She denies any family history of renal or bladder cancer.       Past Medical History:   Past Medical History:   Diagnosis Date    Bowel obstruction     Endometriosis     Hypertension        Past Surgical Historical:   Past Surgical History:   Procedure Laterality Date     SECTION      hernia repair      PARTIAL HYSTERECTOMY      REPAIR OF VAGINAL CUFF          Medications:   Medication List with Changes/Refills   Current Medications     BISACODYL (DULCOLAX) 5 MG EC TABLET    Take 5 mg by mouth daily as needed.    CITALOPRAM (CELEXA) 20 MG TABLET        DICLOFENAC (VOLTAREN) 75 MG EC TABLET    75 mg.    DULOXETINE (CYMBALTA) 20 MG CAPSULE        GABAPENTIN (NEURONTIN) 300 MG CAPSULE    Take 1 capsule (300 mg total) by mouth 3 (three) times daily.    HYDROCHLOROTHIAZIDE (HYDRODIURIL) 12.5 MG TAB        HYDROCODONE-ACETAMINOPHEN (NORCO) 5-325 MG PER TABLET        HYDROCODONE-ACETAMINOPHEN (NORCO) 7.5-325 MG PER TABLET    Take 1 tablet by mouth every 6 (six) hours as needed for Pain.    HYOSCYAMINE (LEVSIN/SL) 0.125 MG SUBL    Place 1 tablet (0.125 mg total) under the tongue every 6 (six) hours as needed (Pelvic pain).    IBUPROFEN (ADVIL,MOTRIN) 800 MG TABLET        INDOMETHACIN (INDOCIN) 25 MG CAPSULE    Take 1 capsule (25 mg total) by mouth 2 (two) times daily as needed (pain).    LISINOPRIL (PRINIVIL,ZESTRIL) 30 MG TABLET        LISINOPRIL 10 MG TABLET        ONDANSETRON (ZOFRAN-ODT) 4 MG TBDL    4 mg.    ONDANSETRON (ZOFRAN-ODT) 8 MG TBDL    Take 1 tablet (8 mg total) by mouth every 8 (eight) hours as needed (nausea).    PHENAZOPYRIDINE (PYRIDIUM) 200 MG TABLET    200 mg.    POLYETHYLENE GLYCOL (GLYCOLAX) 17 GRAM/DOSE POWDER    polyethylene glycol 3350 17 gram/dose oral powder    TRAMADOL (ULTRAM) 50 MG TABLET    Take 1 tablet (50 mg total) by mouth every 6 (six) hours as needed for Pain.        Past Social History:   Social History     Socioeconomic History    Marital status: Single   Tobacco Use    Smoking status: Former     Types: Cigarettes    Smokeless tobacco: Current   Substance and Sexual Activity    Alcohol use: Not Currently    Drug use: Not Currently    Sexual activity: Yes     Partners: Male   Social History Narrative    ** Merged History Encounter **            Allergies: Review of patient's allergies indicates:  No Known Allergies     Family History:   Family History   Problem Relation Name Age of Onset    Cancer Father       Hypertension Father      Diabetes Father      Hypertension Mother      Diabetes Mother      Diabetes Sister      Hypertension Sister          Review of Systems:  Review of Systems   Constitutional:  Negative for activity change, appetite change, chills, diaphoresis, fatigue, fever and unexpected weight change.   HENT:  Negative for congestion, dental problem, drooling, ear discharge, ear pain, facial swelling, hearing loss, mouth sores, nosebleeds, postnasal drip, rhinorrhea, sinus pressure, sinus pain, sneezing, sore throat, tinnitus, trouble swallowing and voice change.    Eyes:  Negative for photophobia, pain, discharge, redness, itching and visual disturbance.   Respiratory:  Negative for apnea, cough, choking, chest tightness, shortness of breath, wheezing and stridor.    Cardiovascular:  Negative for chest pain and leg swelling.   Gastrointestinal:  Positive for abdominal pain, nausea and vomiting. Negative for abdominal distention, anal bleeding, blood in stool, constipation, diarrhea and rectal pain.   Endocrine: Negative for cold intolerance, heat intolerance, polydipsia, polyphagia and polyuria.   Genitourinary:  Positive for difficulty urinating, dysuria, flank pain, hematuria and pelvic pain. Negative for decreased urine volume, dyspareunia, enuresis, frequency, genital sores, menstrual problem, urgency, vaginal bleeding, vaginal discharge and vaginal pain.   Musculoskeletal:  Positive for back pain. Negative for arthralgias, gait problem, joint swelling, myalgias, neck pain and neck stiffness.   Skin:  Negative for color change, pallor, rash and wound.   Allergic/Immunologic: Negative for environmental allergies, food allergies and immunocompromised state.   Neurological:  Negative for dizziness, tremors, seizures, syncope, facial asymmetry, speech difficulty, weakness, light-headedness, numbness and headaches.   Hematological:  Negative for adenopathy. Does not bruise/bleed easily.    Psychiatric/Behavioral:  Negative for agitation, behavioral problems, confusion, decreased concentration, dysphoric mood, hallucinations, self-injury, sleep disturbance and suicidal ideas. The patient is not nervous/anxious and is not hyperactive.      Physical Exam:  Physical Exam  Cardiovascular:      Rate and Rhythm: Normal rate.   Pulmonary:      Effort: Pulmonary effort is normal.   Abdominal:      General: Abdomen is flat. Bowel sounds are normal.      Palpations: Abdomen is soft.   Neurological:      Mental Status: She is alert and oriented to person, place, and time.   Urinalysis: WBCs negative, RBC 10-15 small, epithelial +1, bacteria +1, mucus +1, nitrite negative  Assessment/Plan:     Abnormal findings on CT/hematuria/abdominal pain:  After speaking with Dr. Gunn and per recommendation of radiologist's on previous CT we will set patient up for a CT urogram as well as a cystoscopy due to patient's history.  We will call patient with results.  We will also send urine for culture due to bacteria however I feel this is potentially contamination but if culture results indicate infection we will treat as indicated.    Follow up to be arranged pending results of stated above  Problem List Items Addressed This Visit    None  Visit Diagnoses       Hematuria, undiagnosed cause    -  Primary    Relevant Orders    Cystoscopy    Urine culture    CT Urogram Abd Pelvis W WO    Abnormal findings on examination of genitourinary organs        Relevant Orders    Cystoscopy    CT Urogram Abd Pelvis W WO    Abdominal pain, unspecified abdominal location        Relevant Orders    CT Urogram Abd Pelvis W WO

## 2024-05-27 ENCOUNTER — TELEPHONE (OUTPATIENT)
Dept: UROLOGY | Facility: CLINIC | Age: 39
End: 2024-05-27
Payer: MEDICAID

## 2024-05-27 NOTE — TELEPHONE ENCOUNTER
Please call and inform patient that her CT urogram was negative from a urological standpoint.  There were 2 areas on her right kidney that appear to be lesions however they are too small to accurately identify so I recommend a follow up ultrasound in 6 months.  Please schedule patient for this appointment.    Her CT also identified a nodular area in the right middle lobe of her right lung and the radiologist's is recommended a dedicated chest CT however she will need to follow up with her primary care doctor to discuss this treatment.  Please fax a copy of her CT report to Daniela Lewis who is her primary care doctor

## 2024-05-27 NOTE — TELEPHONE ENCOUNTER
Pt informed of results and scheduled for 6 months out.    ----- Message from Latestephanie Bonilla sent at 5/27/2024 10:26 AM CDT -----  Contact: kayli  Type:  Patient Returning Call    Who Called:kayli  Who Left Message for Patient:Elza Rosas MA  Does the patient know what this is regarding?:results  Would the patient rather a call back or a response via [employer]? unknown  Best Call Back Number:[phone]  Additional Information:

## 2024-06-03 ENCOUNTER — TELEPHONE (OUTPATIENT)
Dept: UROLOGY | Facility: CLINIC | Age: 39
End: 2024-06-03
Payer: MEDICAID

## 2024-06-04 ENCOUNTER — PROCEDURE VISIT (OUTPATIENT)
Dept: UROLOGY | Facility: CLINIC | Age: 39
End: 2024-06-04
Payer: MEDICAID

## 2024-06-04 VITALS
WEIGHT: 187 LBS | DIASTOLIC BLOOD PRESSURE: 101 MMHG | BODY MASS INDEX: 33.13 KG/M2 | SYSTOLIC BLOOD PRESSURE: 169 MMHG | HEIGHT: 63 IN | HEART RATE: 102 BPM

## 2024-06-04 DIAGNOSIS — R39.15 URINARY URGENCY: Primary | ICD-10-CM

## 2024-06-04 DIAGNOSIS — R31.9 HEMATURIA, UNDIAGNOSED CAUSE: ICD-10-CM

## 2024-06-04 DIAGNOSIS — R89.9 ABNORMAL FINDINGS ON EXAMINATION OF GENITOURINARY ORGANS: ICD-10-CM

## 2024-06-04 PROCEDURE — 52000 CYSTOURETHROSCOPY: CPT | Mod: S$GLB,,, | Performed by: UROLOGY

## 2024-06-04 RX ORDER — OXYBUTYNIN CHLORIDE 10 MG/1
10 TABLET, EXTENDED RELEASE ORAL DAILY
Qty: 30 TABLET | Refills: 11 | Status: SHIPPED | OUTPATIENT
Start: 2024-06-04 | End: 2025-06-04

## 2024-06-04 NOTE — PROCEDURES
Cystoscopy    Date/Time: 6/4/2024 11:00 AM    Performed by: Clemente Gunn MD  Authorized by: Sandi Schmidt NP    Timeout: prior to procedure the correct patient, procedure, and site was verified    Prep: patient was prepped and draped in usual sterile fashion    Anesthesia:  Intraurethral instillation  Indications: hematuria    Position:  Supine  Anesthesia:  Intraurethral instillation  Patient sedated?: No    Preparation: Patient was prepped and draped in usual sterile fashion    Scope type:  Flexible cystoscope  External exam normal: Yes    Urethra normal: Yes    Comments:      The patient was brought to the procedure room placed on the table padded prepped and draped in usual sterile fashion in supine position. The cystoscope was inserted into the urethra and advanced the urethra was normal. The bladder was entered and inspected, it was found to be free of tumor stone or foreign body.  Bilateral ureteral orifices were identified and noted to be normal in appearance with clear efflux of urine at this point the scope was removed the patient tolerated the procedure well there were no complications.  Pelvic exam was performed and no abnormalities were noted.

## 2024-06-17 ENCOUNTER — TELEPHONE (OUTPATIENT)
Dept: OBSTETRICS AND GYNECOLOGY | Facility: CLINIC | Age: 39
End: 2024-06-17
Payer: MEDICAID

## 2024-06-17 NOTE — TELEPHONE ENCOUNTER
Called pt no answer left pt vm       ----- Message from Ligia Francois sent at 6/17/2024 12:43 PM CDT -----  Type:  Same Day Appointment Request    Caller is requesting a same day appointment.  Caller declined first available appointment listed below.    Name of Caller:  Patient  When is the first available appointment?   Symptoms: Stomach Pain   Best Call Back Number:  [phone]  Additional Information:   MRN:  [id number]  Dr. Hernán Garcia

## 2024-07-11 ENCOUNTER — TELEPHONE (OUTPATIENT)
Dept: OBSTETRICS AND GYNECOLOGY | Facility: CLINIC | Age: 39
End: 2024-07-11

## 2024-07-11 ENCOUNTER — PATIENT MESSAGE (OUTPATIENT)
Dept: OBSTETRICS AND GYNECOLOGY | Facility: CLINIC | Age: 39
End: 2024-07-11

## 2024-07-11 NOTE — TELEPHONE ENCOUNTER
Left pt a message and advise her that I sent a message in my chart this morning as well that we are canceling visit due to she does not need to see [place] she needs to keep her visit with specialist.

## 2024-09-06 NOTE — TELEPHONE ENCOUNTER
----- Message from Alicia Reyes sent at 7/10/2023 10:08 AM CDT -----  Above pt was scheduled for today but she needed to r/s. Pt was r/s to 9/22. Pt states she was suppose to receive her MRI result at this appt. Pt asked if someone can call her with the results instead. Please advise, Thanks!  Called patient and told her that I see Dr Massey sent her a message via portal.  Patient states that she hasn't been able to get into portal for a while due to  lost password.  Gave her tech support # [phone] and read the impression from her MRI.  I will send this to Dr Massey to see if she wants to schedule a virtual for results  
Statement Selected

## 2024-10-09 ENCOUNTER — OFFICE VISIT (OUTPATIENT)
Dept: GYNECOLOGY | Facility: CLINIC | Age: 39
End: 2024-10-09

## 2024-10-09 VITALS
OXYGEN SATURATION: 100 % | HEIGHT: 63 IN | TEMPERATURE: 98 F | SYSTOLIC BLOOD PRESSURE: 135 MMHG | HEART RATE: 86 BPM | DIASTOLIC BLOOD PRESSURE: 99 MMHG | WEIGHT: 189.38 LBS | BODY MASS INDEX: 33.55 KG/M2

## 2024-10-09 DIAGNOSIS — N80.9 ENDOMETRIOSIS: ICD-10-CM

## 2024-10-09 DIAGNOSIS — R10.2 CHRONIC PELVIC PAIN IN FEMALE: Primary | ICD-10-CM

## 2024-10-09 DIAGNOSIS — G89.29 CHRONIC PELVIC PAIN IN FEMALE: Primary | ICD-10-CM

## 2024-10-09 PROCEDURE — 99214 OFFICE O/P EST MOD 30 MIN: CPT | Mod: PBBFAC

## 2024-10-09 RX ORDER — TRAMADOL HYDROCHLORIDE 50 MG/1
50 TABLET ORAL EVERY 6 HOURS
Qty: 20 TABLET | Refills: 0 | Status: SHIPPED | OUTPATIENT
Start: 2024-10-09 | End: 2024-10-14

## 2024-10-09 NOTE — LETTER
October 9, 2024      [facility]  [address] [address]  Phone: [phone]       Patient: Waldo Yu   YOB: 1985  Date of Visit: 10/09/2024    To Whom It May Concern:    Katie Yu  was at [facility] on 10/09/2024. The patient may return to work/school on 10/10/2024 with no restrictions. If you have any questions or concerns, or if I can be of further assistance, please do not hesitate to contact me.    Sincerely,    Dr. Ross

## 2024-10-09 NOTE — PROGRESS NOTES
[facility] CL[facility]  [facility]  [address]  [place] [address]  Phone: [phone]  Fax: [phone]    Subjective:     Waldo Yu is a 38 y.o.  who presents complaining of exacerbation of chronic pelvic pain. She has a long-standing history of endometriosis and is s/p multiple surgeries for resection of endometriosis (including hysterectomy, BSO, and upper vaginectomy). Pt states that her pain is similar to what it was prior to surgery. She primarily reports suprapubic pain and pain inside of her vagina. States her pain occurs daily and is constant throughout the day. Worse with sitting and alleviated when knees are pulled to chest. She is on multiple pain medications without relief. Currently followed by urology for management of hematuria with most unremarkable CT urogram, CT A/P, and cystoscopy. She was referred to pain management specialist at last clinic visit, but states that she hasn't received a call from anyone.     OBHx:       MedHx:   Past Medical History:   Diagnosis Date    Bowel obstruction     Endometriosis     Hypertension        SurgHx:   Past Surgical History:   Procedure Laterality Date     SECTION      hernia repair      PARTIAL HYSTERECTOMY      REPAIR OF VAGINAL CUFF         Medications:     Current Outpatient Medications:     bisacodyL (DULCOLAX) 5 mg EC tablet, Take 5 mg by mouth daily as needed., Disp: , Rfl:     citalopram (CELEXA) 20 MG tablet, , Disp: , Rfl:     diclofenac (VOLTAREN) 75 MG EC tablet, 75 mg., Disp: , Rfl:     DULoxetine (CYMBALTA) 20 MG capsule, , Disp: , Rfl:     hydroCHLOROthiazide (HYDRODIURIL) 12.5 MG Tab, , Disp: , Rfl:     HYDROcodone-acetaminophen (NORCO) 5-325 mg per tablet, , Disp: , Rfl:     hyoscyamine (LEVSIN/SL) 0.125 mg Subl, Place 1 tablet (0.125 mg total) under the tongue every 6 (six) hours as needed (Pelvic pain)., Disp: 20 tablet, Rfl: 0    ibuprofen (ADVIL,MOTRIN) 800 MG tablet, , Disp: , Rfl:     lisinopriL  (PRINIVIL,ZESTRIL) 30 MG tablet, , Disp: , Rfl:     ondansetron (ZOFRAN-ODT) 8 MG TbDL, Take 1 tablet (8 mg total) by mouth every 8 (eight) hours as needed (nausea)., Disp: 12 tablet, Rfl: 0    oxybutynin (DITROPAN-XL) 10 MG 24 hr tablet, Take 1 tablet (10 mg total) by mouth once daily., Disp: 30 tablet, Rfl: 11    phenazopyridine (PYRIDIUM) 200 MG tablet, 200 mg., Disp: , Rfl:     gabapentin (NEURONTIN) 300 MG capsule, Take 1 capsule (300 mg total) by mouth 3 (three) times daily., Disp: 90 capsule, Rfl: 3    HYDROcodone-acetaminophen (NORCO) 7.5-325 mg per tablet, Take 1 tablet by mouth every 6 (six) hours as needed for Pain. (Patient not taking: Reported on 10/9/2024), Disp: 10 tablet, Rfl: 0    indomethacin (INDOCIN) 25 MG capsule, Take 1 capsule (25 mg total) by mouth 2 (two) times daily as needed (pain). (Patient not taking: Reported on 10/9/2024), Disp: 14 capsule, Rfl: 0    lisinopriL 10 MG tablet, , Disp: , Rfl:     ondansetron (ZOFRAN-ODT) 4 MG TbDL, 4 mg. (Patient not taking: Reported on 10/9/2024), Disp: , Rfl:     polyethylene glycol (GLYCOLAX) 17 gram/dose powder, polyethylene glycol 3350 17 gram/dose oral powder (Patient not taking: Reported on 10/9/2024), Disp: , Rfl:     traMADoL (ULTRAM) 50 mg tablet, Take 1 tablet (50 mg total) by mouth every 6 (six) hours as needed for Pain. (Patient not taking: Reported on 10/9/2024), Disp: 20 tablet, Rfl: 0    traMADoL (ULTRAM) 50 mg tablet, Take 1 tablet (50 mg total) by mouth every 6 (six) hours. for 20 doses, Disp: 20 tablet, Rfl: 0    FM Hx: Denies hx of ovarian, uterine, endometrial, or colon cancer. Denies history of bleeding or coagulation disorders.  Family History   Problem Relation Name Age of Onset    Cancer Father      Hypertension Father      Diabetes Father      Hypertension Mother      Diabetes Mother      Diabetes Sister      Hypertension Sister         Social Hx: Denies tobacco, alcohol and illicit drug usage.  Social History     Socioeconomic  "History    Marital status: Single   Tobacco Use    Smoking status: Former     Types: Cigarettes    Smokeless tobacco: Current   Substance and Sexual Activity    Alcohol use: Not Currently    Drug use: Not Currently    Sexual activity: Yes     Partners: Male   Social History Narrative    ** Merged History Encounter **            Review of Systems  Denies fevers, chills, headache, blurry vision, nausea, vomiting, dizziness, or syncope.   Denies chest pain, shortness of breath, RUQ pain, or calf pain.    Objective:     Vitals:    10/09/24 1247 10/09/24 1250   BP: (!) 149/97 (!) 135/99   BP Location: Left arm Left arm   Pulse: 86    Temp: 98.3 °F (36.8 °C)    SpO2: 100%    Weight: 85.9 kg (189 lb 6.4 oz)    Height: 5' 3" (1.6 m)      Body mass index is 33.55 kg/m².    Physical Exam:     General: alert and oriented, in no acute distress  Lungs: no conversational dyspnea  Heart: regular rate  Abdomen: Soft, non-distended, mild LLQ/RLQ ttp involuntary guarding, no rebound tenderness  Extremities: Normal, atraumatic, non-edematous, No cords or calf tenderness, No significant calf/ankle edema  External genitalia: Normal female genitalia without lesion, discharge or tenderness. Normal appearing urethral meatus. Normal appearing external anus.  Bimanual Exam: Vagina with adequate capacity. Absent uterus. Normal urethra, normal bladder  Speculum Exam: Vaginal mucosa normal in appearance. Pink. No masses/lesions. Cervix absent, some physiologic discharge noted.   Pelvic Floor: levator ani non-ttp, piriformis non-ttp, obturator non-ttp, no urethra or bladder neck ttp    Note: RN chaperone present for entirety of exam.     Imaging  No images are attached to the encounter.  Assessment/Plan:    Waldo Yu is a 38 y.o.  with chronic pelvic pain    Chronic Pelvic Pain, Endometriosis  Discussed with patient that there is no additional surgical intervention that can be performed   Referral to pain specialist given to patient " for her to give to provider of her choosing   Rx for 20 tablets of tramadol sent    RTC 1 year for WWE with NP     Future Appointments   Date Time Provider Department Center   12/2/2024  9:40 AM Sandi Schmidt NP [facility] UROLOGY  [address]   10/13/2025  8:50 AM Ly Stapleton FNP [facility] GYN [place]       Patient and plan were discussed with Dr. Ross.      Valencia Velasco MD   PGY2, Obstetrics & Gynecology   [facility]

## 2024-10-09 NOTE — LETTER
2024    Ambulatory Referral to Pain Management             [facility]  [address] [address]  Phone: [phone]   Patient: Waldo Yu   MR Number: [id number]   YOB: 1985   Date of Visit: 10/9/2024       Dear :    I am referring my patient, Waldo Yu, to you for evaluation of chronic pelvic pain.    She  has a past medical history of Bowel obstruction, Endometriosis, and Hypertension. Her  has a past surgical history that includes  section; hernia repair; Repair of vaginal cuff; and Partial hysterectomy. She  reports that she has quit smoking. Her smoking use included cigarettes. She uses smokeless tobacco. She reports that she does not currently use alcohol. She reports that she does not currently use drugs.    She has a current medication list which includes the following prescription(s): bisacodyl, citalopram, diclofenac, duloxetine, hydrochlorothiazide, hydrocodone-acetaminophen, hyoscyamine, ibuprofen, lisinopril, ondansetron, oxybutynin, phenazopyridine, gabapentin, hydrocodone-acetaminophen, indomethacin, lisinopril, ondansetron, polyethylene glycol, tramadol, and tramadol. She has No Known Allergies.    I appreciate your assistance in her care and look forward to your findings and recommendations.    Sincerely,                           Valencia Velasco MD   PGY2, Obstetrics & Gynecology   [facility]

## 2025-07-16 ENCOUNTER — TELEPHONE (OUTPATIENT)
Dept: GYNECOLOGY | Facility: CLINIC | Age: 40
End: 2025-07-16
Payer: MEDICAID

## 2025-07-16 NOTE — TELEPHONE ENCOUNTER
Moved patient's appointment to gyn and off of Jessica's template. Patient instructed to go to ED in the meantime or check with PCP. C/o blood in urine and sees a urologist so directed her to that provider. ----- Message from Marguerite sent at 7/16/2025  2:24 PM CDT -----  Regarding: Appointment  The patient above called because she is having a lot of lower abdominal pain and say a GYN doctor in [place] where she lives and was told that the Endometriosis has gotten worse and she would need to be seen. She is requesting to be seen as soon as possible. Please advise, Thanks.

## 2025-07-30 ENCOUNTER — HOSPITAL ENCOUNTER (EMERGENCY)
Facility: HOSPITAL | Age: 40
Discharge: HOME | End: 2025-07-30
Attending: EMERGENCY MEDICINE
Payer: MEDICAID

## 2025-07-30 VITALS
OXYGEN SATURATION: 99 % | RESPIRATION RATE: 18 BRPM | SYSTOLIC BLOOD PRESSURE: 178 MMHG | DIASTOLIC BLOOD PRESSURE: 99 MMHG | BODY MASS INDEX: 31.95 KG/M2 | TEMPERATURE: 98 F | HEIGHT: 63 IN | WEIGHT: 180.31 LBS | HEART RATE: 69 BPM

## 2025-07-30 DIAGNOSIS — R10.32 BILATERAL LOWER ABDOMINAL PAIN: Primary | ICD-10-CM

## 2025-07-30 DIAGNOSIS — R10.31 BILATERAL LOWER ABDOMINAL PAIN: Primary | ICD-10-CM

## 2025-07-30 LAB
ALBUMIN SERPL-MCNC: 3.9 G/DL (ref 3.5–5)
ALBUMIN/GLOB SERPL: 0.9 RATIO (ref 1.1–2)
ALP SERPL-CCNC: 102 UNIT/L (ref 40–150)
ALT SERPL-CCNC: 22 UNIT/L (ref 0–55)
ANION GAP SERPL CALC-SCNC: 6 MEQ/L
AST SERPL-CCNC: 15 UNIT/L (ref 11–45)
B-HCG UR QL: NEGATIVE
BACTERIA #/AREA URNS AUTO: ABNORMAL /HPF
BASOPHILS # BLD AUTO: 0.04 X10(3)/MCL
BASOPHILS NFR BLD AUTO: 0.6 %
BILIRUB SERPL-MCNC: 0.3 MG/DL
BILIRUB UR QL STRIP.AUTO: NEGATIVE
BUN SERPL-MCNC: 12.4 MG/DL (ref 7–18.7)
CALCIUM SERPL-MCNC: 9.7 MG/DL (ref 8.4–10.2)
CHLORIDE SERPL-SCNC: 111 MMOL/L (ref 98–107)
CLARITY UR: CLEAR
CO2 SERPL-SCNC: 24 MMOL/L (ref 22–29)
COLOR UR AUTO: ABNORMAL
CREAT SERPL-MCNC: 0.92 MG/DL (ref 0.55–1.02)
CREAT/UREA NIT SERPL: 13
CTP QC/QA: YES
EOSINOPHIL # BLD AUTO: 0.08 X10(3)/MCL (ref 0–0.9)
EOSINOPHIL NFR BLD AUTO: 1.2 %
ERYTHROCYTE [DISTWIDTH] IN BLOOD BY AUTOMATED COUNT: 13.2 % (ref 11.5–17)
GFR SERPLBLD CREATININE-BSD FMLA CKD-EPI: >60 ML/MIN/1.73/M2
GLOBULIN SER-MCNC: 4.4 GM/DL (ref 2.4–3.5)
GLUCOSE SERPL-MCNC: 127 MG/DL (ref 74–100)
GLUCOSE UR QL STRIP: ABNORMAL
HCT VFR BLD AUTO: 45.7 % (ref 37–47)
HGB BLD-MCNC: 14.4 G/DL (ref 12–16)
HGB UR QL STRIP: ABNORMAL
HYALINE CASTS #/AREA URNS LPF: ABNORMAL /LPF
IMM GRANULOCYTES # BLD AUTO: 0.02 X10(3)/MCL (ref 0–0.04)
IMM GRANULOCYTES NFR BLD AUTO: 0.3 %
KETONES UR QL STRIP: NEGATIVE
LEUKOCYTE ESTERASE UR QL STRIP: NEGATIVE
LIPASE SERPL-CCNC: 19 U/L
LYMPHOCYTES # BLD AUTO: 2.12 X10(3)/MCL (ref 0.6–4.6)
LYMPHOCYTES NFR BLD AUTO: 31.8 %
MCH RBC QN AUTO: 27.7 PG (ref 27–31)
MCHC RBC AUTO-ENTMCNC: 31.5 G/DL (ref 33–36)
MCV RBC AUTO: 87.9 FL (ref 80–94)
MONOCYTES # BLD AUTO: 0.49 X10(3)/MCL (ref 0.1–1.3)
MONOCYTES NFR BLD AUTO: 7.3 %
MUCOUS THREADS URNS QL MICRO: ABNORMAL /LPF
NEUTROPHILS # BLD AUTO: 3.92 X10(3)/MCL (ref 2.1–9.2)
NEUTROPHILS NFR BLD AUTO: 58.8 %
NITRITE UR QL STRIP: NEGATIVE
NRBC BLD AUTO-RTO: 0 %
PH UR STRIP: 6.5 [PH]
PLATELET # BLD AUTO: 219 X10(3)/MCL (ref 130–400)
PMV BLD AUTO: 10.9 FL (ref 7.4–10.4)
POTASSIUM SERPL-SCNC: 3.5 MMOL/L (ref 3.5–5.1)
PROT SERPL-MCNC: 8.3 GM/DL (ref 6.4–8.3)
PROT UR QL STRIP: NEGATIVE
RBC # BLD AUTO: 5.2 X10(6)/MCL (ref 4.2–5.4)
RBC #/AREA URNS AUTO: ABNORMAL /HPF
SODIUM SERPL-SCNC: 141 MMOL/L (ref 136–145)
SP GR UR STRIP.AUTO: 1.01 (ref 1–1.03)
SQUAMOUS #/AREA URNS LPF: ABNORMAL /HPF
UROBILINOGEN UR STRIP-ACNC: NORMAL
WBC # BLD AUTO: 6.67 X10(3)/MCL (ref 4.5–11.5)
WBC #/AREA URNS AUTO: ABNORMAL /HPF

## 2025-07-30 PROCEDURE — 63600175 PHARM REV CODE 636 W HCPCS: Performed by: PHYSICIAN ASSISTANT

## 2025-07-30 PROCEDURE — 81001 URINALYSIS AUTO W/SCOPE: CPT | Performed by: PHYSICIAN ASSISTANT

## 2025-07-30 PROCEDURE — 80053 COMPREHEN METABOLIC PANEL: CPT | Performed by: PHYSICIAN ASSISTANT

## 2025-07-30 PROCEDURE — 83690 ASSAY OF LIPASE: CPT | Performed by: PHYSICIAN ASSISTANT

## 2025-07-30 PROCEDURE — 81025 URINE PREGNANCY TEST: CPT | Performed by: PHYSICIAN ASSISTANT

## 2025-07-30 PROCEDURE — 96374 THER/PROPH/DIAG INJ IV PUSH: CPT | Mod: 59

## 2025-07-30 PROCEDURE — 85025 COMPLETE CBC W/AUTO DIFF WBC: CPT | Performed by: PHYSICIAN ASSISTANT

## 2025-07-30 PROCEDURE — 99285 EMERGENCY DEPT VISIT HI MDM: CPT | Mod: 25

## 2025-07-30 PROCEDURE — 96376 TX/PRO/DX INJ SAME DRUG ADON: CPT

## 2025-07-30 PROCEDURE — 25500020 PHARM REV CODE 255: Performed by: PHYSICIAN ASSISTANT

## 2025-07-30 PROCEDURE — 96375 TX/PRO/DX INJ NEW DRUG ADDON: CPT

## 2025-07-30 RX ORDER — OXYCODONE AND ACETAMINOPHEN 5; 325 MG/1; MG/1
1 TABLET ORAL EVERY 6 HOURS PRN
Qty: 12 TABLET | Refills: 0 | Status: SHIPPED | OUTPATIENT
Start: 2025-07-30

## 2025-07-30 RX ORDER — LISINOPRIL 30 MG/1
30 TABLET ORAL DAILY
Qty: 30 TABLET | Refills: 0 | Status: SHIPPED | OUTPATIENT
Start: 2025-07-30 | End: 2025-08-29

## 2025-07-30 RX ORDER — OXYCODONE AND ACETAMINOPHEN 5; 325 MG/1; MG/1
1 TABLET ORAL
Refills: 0 | Status: DISCONTINUED | OUTPATIENT
Start: 2025-07-30 | End: 2025-07-30

## 2025-07-30 RX ORDER — MORPHINE SULFATE 2 MG/ML
4 INJECTION, SOLUTION INTRAMUSCULAR; INTRAVENOUS ONCE
Refills: 0 | Status: COMPLETED | OUTPATIENT
Start: 2025-07-30 | End: 2025-07-30

## 2025-07-30 RX ORDER — ONDANSETRON HYDROCHLORIDE 2 MG/ML
4 INJECTION, SOLUTION INTRAVENOUS
Status: COMPLETED | OUTPATIENT
Start: 2025-07-30 | End: 2025-07-30

## 2025-07-30 RX ADMIN — MORPHINE SULFATE 4 MG: 2 INJECTION, SOLUTION INTRAMUSCULAR; INTRAVENOUS at 02:07

## 2025-07-30 RX ADMIN — ONDANSETRON 4 MG: 2 INJECTION INTRAMUSCULAR; INTRAVENOUS at 02:07

## 2025-07-30 RX ADMIN — IOHEXOL 100 ML: 350 INJECTION, SOLUTION INTRAVENOUS at 02:07

## 2025-07-30 RX ADMIN — MORPHINE SULFATE 4 MG: 2 INJECTION, SOLUTION INTRAMUSCULAR; INTRAVENOUS at 12:07

## 2025-07-30 NOTE — ED PROVIDER NOTES
Encounter Date: 2025       History     Chief Complaint   Patient presents with    Abdominal Pain     Pt arrived to ED with c/o lower abd pain x 1-2 weeks. Pt states was seen at [facility] and had pelvic ultrasound done. Had Hysterectomy but possibly may still have 1 ovary. + vomiting off and on x 1 week.      39-year-old female with a history of hypertension, endometriosis bowel obstruction, presents to the emergency department complaints of diffuse lower abdominal pain x 1 week.  Patient was seen at another facility yesterday and had an unremarkable pelvic ultrasound.  She states other providers think it is endometriosis however it feels a little different than her previous endometriosis pain.  She denies dysuria, vaginal discharge, fever.    The history is provided by the patient. No  was used.     Review of patient's allergies indicates:  No Known Allergies  Past Medical History:   Diagnosis Date    Bowel obstruction     Endometriosis     Hypertension      Past Surgical History:   Procedure Laterality Date     SECTION      hernia repair      PARTIAL HYSTERECTOMY      REPAIR OF VAGINAL CUFF       Family History   Problem Relation Name Age of Onset    Cancer Father      Hypertension Father      Diabetes Father      Hypertension Mother      Diabetes Mother      Diabetes Sister      Hypertension Sister       Social History[1]  Review of Systems   Gastrointestinal:  Positive for abdominal pain and vomiting. Negative for diarrhea.   Genitourinary:  Negative for dysuria.       Physical Exam     Initial Vitals [25 1123]   BP Pulse Resp Temp SpO2   (!) 163/121 99 20 97.9 °F (36.6 °C) 100 %      MAP       --         Physical Exam    Nursing note and vitals reviewed.  Constitutional: She appears well-developed and well-nourished.   HENT:   Head: Normocephalic and atraumatic.   Cardiovascular:  Normal rate.           Pulmonary/Chest: Breath sounds normal.   Abdominal: Abdomen is soft.  Bowel sounds are normal. There is no abdominal tenderness. There is no rebound and no guarding.   Musculoskeletal:         General: Normal range of motion.     Neurological: She is alert.   Skin: Skin is warm.         ED Course   Procedures  Labs Reviewed   COMPREHENSIVE METABOLIC PANEL - Abnormal       Result Value    Sodium 141      Potassium 3.5      Chloride 111 (*)     CO2 24      Glucose 127 (*)     Blood Urea Nitrogen 12.4      Creatinine 0.92      Calcium 9.7      Protein Total 8.3      Albumin 3.9      Globulin 4.4 (*)     Albumin/Globulin Ratio 0.9 (*)     Bilirubin Total 0.3            ALT 22      AST 15      eGFR >60      Anion Gap 6.0      BUN/Creatinine Ratio 13     URINALYSIS, REFLEX TO URINE CULTURE - Abnormal    Color, UA Light-Yellow      Appearance, UA Clear      Specific Gravity, UA 1.010      pH, UA 6.5      Protein, UA Negative      Glucose, UA 2+ (*)     Ketones, UA Negative      Blood, UA Trace (*)     Bilirubin, UA Negative      Urobilinogen, UA Normal      Nitrites, UA Negative      Leukocyte Esterase, UA Negative      RBC, UA 0-5      WBC, UA 0-5      Bacteria, UA None Seen      Squamous Epithelial Cells, UA Occasional (*)     Mucous, UA Trace (*)     Hyaline Casts, UA None Seen     CBC WITH DIFFERENTIAL - Abnormal    WBC 6.67      RBC 5.20      Hgb 14.4      Hct 45.7      MCV 87.9      MCH 27.7      MCHC 31.5 (*)     RDW 13.2      Platelet 219      MPV 10.9 (*)     Neut % 58.8      Lymph % 31.8      Mono % 7.3      Eos % 1.2      Basophil % 0.6      Imm Grans % 0.3      Neut # 3.92      Lymph # 2.12      Mono # 0.49      Eos # 0.08      Baso # 0.04      Imm Gran # 0.02      NRBC% 0.0     LIPASE - Normal    Lipase Level 19     CBC W/ AUTO DIFFERENTIAL    Narrative:     The following orders were created for panel order CBC Auto Differential.  Procedure                               Abnormality         Status                     ---------                               -----------          ------                     CBC with Differential[[id number]]       Abnormal            Final result                 Please view results for these tests on the individual orders.   POCT URINE PREGNANCY    POC Preg Test, Ur Negative       Acceptable Yes            Imaging Results              CT Abdomen Pelvis With IV Contrast NO Oral Contrast (Final result)  Result time 07/30/25 15:12:38      Final result by Kerwin Neely MD (07/30/25 15:12:38)                   Impression:      No evidence of an acute process in the abdomen and pelvis.      Electronically signed by: Kerwin Neely  Date:    07/30/2025  Time:    15:12               Narrative:    EXAMINATION:  CT ABDOMEN PELVIS WITH IV CONTRAST    CLINICAL HISTORY:  Abdominal pain, acute, nonlocalized;Diffuse lower abdominal pain;    TECHNIQUE:  Axial CT images were obtained through the abdomen and pelvis with IV contrast.  Coronal and sagittal reconstructions submitted and interpreted. Automated exposure control utilized limiting radiation dose to the patient.  DLP: 364 mGy-cm    COMPARISON:  CT abdomen and pelvis 12/06/2023    FINDINGS:  Visualized Thorax: The lung bases are clear.  Heart is normal in size.    Liver, Gallbladder, and Biliary System: The liver is normal in size and attenuation. There is no intra or extrahepatic biliary ductal dilatation. Postsurgical changes of cholecystectomy.    Spleen: The spleen is normal in size.    Pancreas: No inflammatory changes or ductal dilatation.    Adrenal Glands: Normal.    Kidneys, Ureter, Bladder: Subcentimeter cyst noted within the right kidney.  The kidneys are otherwise normal in appearance with no evidence of hydronephrosis or radiopaque stones.  The ureters are normal in caliber.  No bladder wall thickening.    Peritoneum: No free fluid or free air.    GI Tract: No evidence of an obstructive process or inflammatory changes.  Normal appendix.    Reproductive Organs: The uterus and adnexal  structures are unremarkable.    Lymph Nodes: No pathologically enlarged lymph nodes are identified.    Vascular: The visualized aorta is normal in caliber.    Bones: Grade 1 retrolisthesis of L5 on S1.  No acute osseous findings.    Soft Tissues: The soft tissues are unremarkable.                                       Medications   morphine injection 4 mg (4 mg Intravenous Given 7/30/25 1227)   ondansetron injection 4 mg (4 mg Intravenous Given 7/30/25 1455)   morphine injection 4 mg (4 mg Intravenous Given 7/30/25 1455)   iohexoL (OMNIPAQUE 350) injection 100 mL (100 mLs Intravenous Given 7/30/25 1448)     Medical Decision Making  39-year-old female with a history of hypertension, endometriosis bowel obstruction, presents to the emergency department complaints of diffuse lower abdominal pain x 1 week.  Patient was seen at another facility yesterday and had an unremarkable pelvic ultrasound.  She states other providers think it is endometriosis however it feels a little different than her previous endometriosis pain.  She denies dysuria, vaginal discharge, fever.    DDx:  Endometriosis, UTI, constipation    Blood work, urine and CT abdomen and pelvis unremarkable.  Prescribed Percocet and she will follow up with gyn.    Amount and/or Complexity of Data Reviewed  External Data Reviewed: labs and radiology.  Labs: ordered.  Radiology: ordered. Decision-making details documented in ED Course.    Risk  Prescription drug management.               ED Course as of 07/30/25 1605   Wed Jul 30, 2025   1536 CT Abdomen Pelvis With IV Contrast NO Oral Contrast  No evidence of an acute process in the abdomen and pelvis.      [ER]   1602 The patient is resting comfortably and in no acute distress.  She states that her symptoms have improved after treatment in Emergency Department. I personally discussed her test results and treatment plan.  Gave strict ED precautions, discussed specific conditions for return to the emergency  department and importance of follow up with her primary care provider.  Patient voices understanding and agrees to the plan discussed. All patients' questions have been answered at this time.   She has remained hemodynamically stable throughout entire stay in ED and is stable for discharge home.  [ER]      ED Course User Index  [ER] Elly Garcia PA                               Clinical Impression:  Final diagnoses:  [R10.31, R10.32] Bilateral lower abdominal pain (Primary)          ED Disposition Condition    Discharge Stable          ED Prescriptions       Medication Sig Dispense Start Date End Date Auth. Provider    oxyCODONE-acetaminophen (PERCOCET) 5-325 mg per tablet Take 1 tablet by mouth every 6 (six) hours as needed for Pain. 12 tablet 7/30/2025 -- Elly Garcia PA          Follow-up Information       Follow up With Specialties Details Why Contact Info    [facility] Emergency Dept Emergency Medicine  As needed, If symptoms worsen [address] [facility] [address] [phone]    Daniela Lewis, [facility]   [address] [place] [address] [id number] [phone]      [facility] - GYN Gynecology Schedule an appointment as soon as possible for a visit   [facility] [facility] [address] [phone]                       [1]   Social History  Tobacco Use    Smoking status: Former     Types: Cigarettes    Smokeless tobacco: Current   Substance Use Topics    Alcohol use: Not Currently    Drug use: Not Currently        Elly Garcia PA  07/30/25 2566

## 2025-07-30 NOTE — DISCHARGE INSTRUCTIONS
Take medications as needed for pain.  Follow up with gyn and  PCP within a week.  Return if symptoms worsen.